# Patient Record
Sex: MALE | Race: WHITE
[De-identification: names, ages, dates, MRNs, and addresses within clinical notes are randomized per-mention and may not be internally consistent; named-entity substitution may affect disease eponyms.]

---

## 2017-05-16 ENCOUNTER — HOSPITAL ENCOUNTER (OUTPATIENT)
Dept: HOSPITAL 45 - C.LABBFT | Age: 77
Discharge: HOME | End: 2017-05-16
Attending: INTERNAL MEDICINE
Payer: COMMERCIAL

## 2017-05-16 DIAGNOSIS — N18.3: ICD-10-CM

## 2017-05-16 DIAGNOSIS — I10: Primary | ICD-10-CM

## 2017-05-16 DIAGNOSIS — R60.9: ICD-10-CM

## 2017-05-16 DIAGNOSIS — E55.9: ICD-10-CM

## 2017-05-16 DIAGNOSIS — E78.5: ICD-10-CM

## 2017-05-16 LAB
ALT SERPL-CCNC: 25 U/L (ref 12–78)
ANION GAP SERPL CALC-SCNC: 5 MMOL/L (ref 3–11)
APPEARANCE UR: CLEAR
AST SERPL-CCNC: 17 U/L (ref 15–37)
BILIRUB UR-MCNC: (no result) MG/DL
BUN SERPL-MCNC: 15 MG/DL (ref 7–18)
BUN/CREAT SERPL: 7.7 (ref 10–20)
CALCIUM SERPL-MCNC: 9.2 MG/DL (ref 8.5–10.1)
CHLORIDE SERPL-SCNC: 105 MMOL/L (ref 98–107)
CO2 SERPL-SCNC: 33 MMOL/L (ref 21–32)
COLOR UR: YELLOW
CREAT SERPL-MCNC: 1.9 MG/DL (ref 0.6–1.4)
CREAT UR-MCNC: 160 MG/DL
EOSINOPHIL NFR BLD AUTO: 145 K/UL (ref 130–400)
GLUCOSE SERPL-MCNC: 100 MG/DL (ref 70–99)
HCT VFR BLD CALC: 47.3 % (ref 42–52)
MANUAL MICROSCOPIC REQUIRED?: NO
MCH RBC QN AUTO: 30.9 PG (ref 25–34)
MCHC RBC AUTO-ENTMCNC: 32.6 G/DL (ref 32–36)
MCV RBC AUTO: 94.8 FL (ref 80–100)
NITRITE UR QL STRIP: (no result)
PH UR STRIP: 6 [PH] (ref 4.5–7.5)
PHOSPHATE SERPL-MCNC: 2.9 MG/DL (ref 2.5–4.9)
PMV BLD AUTO: 10.4 FL (ref 7.4–10.4)
POTASSIUM SERPL-SCNC: 4.2 MMOL/L (ref 3.5–5.1)
PROT UR STRIP-MCNC: 23.6 MG/DL (ref 0–11.9)
RBC # BLD AUTO: 4.99 M/UL (ref 4.7–6.1)
REVIEW REQ?: NO
SODIUM SERPL-SCNC: 143 MMOL/L (ref 136–145)
SP GR UR STRIP: 1.01 (ref 1–1.03)
URINE EPITHELIAL CELL AUTO: (no result) /LPF (ref 0–5)
URINE PROTIEN/CREAT RATIO: 0.2 (ref 0–0.2)
UROBILINOGEN UR-MCNC: (no result) MG/DL
WBC # BLD AUTO: 8.09 K/UL (ref 4.8–10.8)

## 2017-05-22 ENCOUNTER — HOSPITAL ENCOUNTER (OUTPATIENT)
Dept: HOSPITAL 45 - C.RAD1850 | Age: 77
Discharge: HOME | End: 2017-05-22
Attending: INTERNAL MEDICINE
Payer: COMMERCIAL

## 2017-05-22 DIAGNOSIS — I51.7: ICD-10-CM

## 2017-05-22 DIAGNOSIS — I12.9: ICD-10-CM

## 2017-05-22 DIAGNOSIS — R60.9: ICD-10-CM

## 2017-05-22 DIAGNOSIS — N18.3: Primary | ICD-10-CM

## 2017-05-22 NOTE — DIAGNOSTIC IMAGING REPORT
TWO VIEW CHEST



CLINICAL HISTORY: COPD. History of left lung resection.



FINDINGS: PA and lateral chest radiographs are compared to study dated 10/4/2016

and correlated with chest CT dated 3/7/2016. The PA view is degraded by patient

rotation. The heart is enlarged and there is atherosclerotic calcification of

the thoracic aorta.  The pulmonary vasculature is noncongested. Emphysema and

chronic interstitial thickening are similar to previous. There is postoperative

change and volume loss in the left lung consistent with a history of left sided

pulmonary resection. Airspace opacities at the left lung base are similar to

previous. The right lung is grossly clear. No pleural effusion is identified.

There is no pneumothorax. The skeletal structures are osteopenic. Degenerative

change is noted throughout the thoracic spine. Postoperative change is suggested

in the left ribs.



IMPRESSION:



1. Cardiomegaly and emphysema.



2. There are postoperative changes from left-sided pulmonary resection.



3. Airspace opacities at the left lung base are unchanged from previous.



4. No acute cardiopulmonary abnormality is seen.







Electronically signed by:  Lico Blood M.D.

5/22/2017 2:10 PM



Dictated Date/Time:  5/22/2017 2:08 PM

## 2017-11-14 ENCOUNTER — HOSPITAL ENCOUNTER (OUTPATIENT)
Dept: HOSPITAL 45 - C.LABBFT | Age: 77
Discharge: HOME | End: 2017-11-14
Attending: INTERNAL MEDICINE
Payer: COMMERCIAL

## 2017-11-14 DIAGNOSIS — I12.9: Primary | ICD-10-CM

## 2017-11-14 DIAGNOSIS — E55.9: ICD-10-CM

## 2017-11-14 DIAGNOSIS — N18.3: ICD-10-CM

## 2017-11-14 DIAGNOSIS — R60.9: ICD-10-CM

## 2017-11-14 DIAGNOSIS — E78.5: ICD-10-CM

## 2017-11-14 DIAGNOSIS — R73.01: ICD-10-CM

## 2017-11-14 LAB
ANION GAP SERPL CALC-SCNC: 5 MMOL/L (ref 3–11)
APPEARANCE UR: CLEAR
BILIRUB UR-MCNC: (no result) MG/DL
BUN SERPL-MCNC: 14 MG/DL (ref 7–18)
BUN/CREAT SERPL: 9.2 (ref 10–20)
CALCIUM SERPL-MCNC: 9.2 MG/DL (ref 8.5–10.1)
CHLORIDE SERPL-SCNC: 104 MMOL/L (ref 98–107)
CHOLEST/HDLC SERPL: 3.5 {RATIO}
CO2 SERPL-SCNC: 33 MMOL/L (ref 21–32)
COLOR UR: YELLOW
CREAT SERPL-MCNC: 1.55 MG/DL (ref 0.6–1.4)
CREAT UR-MCNC: 125 MG/DL
EOSINOPHIL NFR BLD AUTO: 149 K/UL (ref 130–400)
EST. AVERAGE GLUCOSE BLD GHB EST-MCNC: 103 MG/DL
GLUCOSE SERPL-MCNC: 98 MG/DL (ref 70–99)
GLUCOSE UR QL: 42 MG/DL
HCT VFR BLD CALC: 40.5 % (ref 42–52)
KETONES UR QL STRIP: 84 MG/DL
MANUAL MICROSCOPIC REQUIRED?: NO
MCH RBC QN AUTO: 30.5 PG (ref 25–34)
MCHC RBC AUTO-ENTMCNC: 32.6 G/DL (ref 32–36)
MCV RBC AUTO: 93.5 FL (ref 80–100)
NITRITE UR QL STRIP: (no result)
NITRITE UR QL STRIP: 110 MG/DL (ref 0–150)
PH UR STRIP: 6 [PH] (ref 4.5–7.5)
PH UR: 148 MG/DL (ref 0–200)
PHOSPHATE SERPL-MCNC: 2.6 MG/DL (ref 2.5–4.9)
PMV BLD AUTO: 10.1 FL (ref 7.4–10.4)
POTASSIUM SERPL-SCNC: 4 MMOL/L (ref 3.5–5.1)
PROT UR STRIP-MCNC: 21.9 MG/DL (ref 0–11.9)
RBC # BLD AUTO: 4.33 M/UL (ref 4.7–6.1)
REVIEW REQ?: NO
SODIUM SERPL-SCNC: 142 MMOL/L (ref 136–145)
SP GR UR STRIP: 1.01 (ref 1–1.03)
URINE EPITHELIAL CELL AUTO: (no result) /LPF (ref 0–5)
URINE PROTIEN/CREAT RATIO: 0.2 (ref 0–0.2)
UROBILINOGEN UR-MCNC: (no result) MG/DL
VERY LOW DENSITY LIPOPROT CALC: 22 MG/DL
WBC # BLD AUTO: 8.45 K/UL (ref 4.8–10.8)

## 2017-11-27 ENCOUNTER — HOSPITAL ENCOUNTER (INPATIENT)
Dept: HOSPITAL 45 - C.EDB | Age: 77
LOS: 3 days | Discharge: HOME HEALTH | DRG: 178 | End: 2017-11-30
Attending: HOSPITALIST | Admitting: HOSPITALIST
Payer: COMMERCIAL

## 2017-11-27 VITALS
TEMPERATURE: 97.52 F | DIASTOLIC BLOOD PRESSURE: 102 MMHG | OXYGEN SATURATION: 91 % | HEART RATE: 108 BPM | SYSTOLIC BLOOD PRESSURE: 164 MMHG

## 2017-11-27 VITALS
HEIGHT: 72 IN | WEIGHT: 243.83 LBS | HEIGHT: 72 IN | BODY MASS INDEX: 33.03 KG/M2 | WEIGHT: 243.83 LBS | BODY MASS INDEX: 33.03 KG/M2

## 2017-11-27 DIAGNOSIS — J69.0: Primary | ICD-10-CM

## 2017-11-27 DIAGNOSIS — Z79.899: ICD-10-CM

## 2017-11-27 DIAGNOSIS — N18.3: ICD-10-CM

## 2017-11-27 DIAGNOSIS — J96.10: ICD-10-CM

## 2017-11-27 DIAGNOSIS — I12.9: ICD-10-CM

## 2017-11-27 DIAGNOSIS — I48.91: ICD-10-CM

## 2017-11-27 DIAGNOSIS — Z79.01: ICD-10-CM

## 2017-11-27 DIAGNOSIS — Z99.81: ICD-10-CM

## 2017-11-27 DIAGNOSIS — J44.1: ICD-10-CM

## 2017-11-27 DIAGNOSIS — R04.2: ICD-10-CM

## 2017-11-27 DIAGNOSIS — G62.9: ICD-10-CM

## 2017-11-27 DIAGNOSIS — H40.9: ICD-10-CM

## 2017-11-27 DIAGNOSIS — G47.00: ICD-10-CM

## 2017-11-27 LAB
ALBUMIN/GLOB SERPL: 0.8 {RATIO} (ref 0.9–2)
ALP SERPL-CCNC: 60 U/L (ref 45–117)
ALT SERPL-CCNC: 14 U/L (ref 12–78)
ANION GAP SERPL CALC-SCNC: 4 MMOL/L (ref 3–11)
APPEARANCE UR: CLEAR
AST SERPL-CCNC: 13 U/L (ref 15–37)
BASOPHILS # BLD: 0.04 K/UL (ref 0–0.2)
BASOPHILS NFR BLD: 0.4 %
BILIRUB UR-MCNC: (no result) MG/DL
BUN SERPL-MCNC: 8 MG/DL (ref 7–18)
BUN/CREAT SERPL: 6 (ref 10–20)
CALCIUM SERPL-MCNC: 8.4 MG/DL (ref 8.5–10.1)
CHLORIDE SERPL-SCNC: 101 MMOL/L (ref 98–107)
CKMB/CK RATIO: 2.1 (ref 0–3)
CO2 SERPL-SCNC: 32 MMOL/L (ref 21–32)
COLOR UR: YELLOW
COMPLETE: YES
CREAT CL PREDICTED SERPL C-G-VRATE: 56 ML/MIN
CREAT SERPL-MCNC: 1.4 MG/DL (ref 0.6–1.4)
EOSINOPHIL NFR BLD AUTO: 135 K/UL (ref 130–400)
GLOBULIN SER-MCNC: 3.8 GM/DL (ref 2.5–4)
GLUCOSE SERPL-MCNC: 106 MG/DL (ref 70–99)
HCT VFR BLD CALC: 40.4 % (ref 42–52)
IG%: 0.2 %
IMM GRANULOCYTES NFR BLD AUTO: 12.1 %
INR PPP: 2.2 (ref 0.9–1.1)
LYMPHOCYTES # BLD: 1.17 K/UL (ref 1.2–3.4)
MANUAL MICROSCOPIC REQUIRED?: NO
MCH RBC QN AUTO: 30.9 PG (ref 25–34)
MCHC RBC AUTO-ENTMCNC: 32.9 G/DL (ref 32–36)
MCV RBC AUTO: 93.7 FL (ref 80–100)
MONOCYTES NFR BLD: 5.5 %
NEUTROPHILS # BLD AUTO: 2.7 %
NEUTROPHILS NFR BLD AUTO: 79.1 %
NITRITE UR QL STRIP: (no result)
PARTIAL THROMBOPLASTIN RATIO: 1.5
PH UR STRIP: 6 [PH] (ref 4.5–7.5)
PMV BLD AUTO: 9.6 FL (ref 7.4–10.4)
POTASSIUM SERPL-SCNC: 3.6 MMOL/L (ref 3.5–5.1)
PROTHROMBIN TIME: 24.4 SECONDS (ref 9–12)
RBC # BLD AUTO: 4.31 M/UL (ref 4.7–6.1)
REVIEW REQ?: NO
SODIUM SERPL-SCNC: 137 MMOL/L (ref 136–145)
SP GR UR STRIP: 1.02 (ref 1–1.03)
URINE BILL WITH OR WITHOUT MIC: (no result)
URINE EPITHELIAL CELL AUTO: (no result) /LPF (ref 0–5)
UROBILINOGEN UR-MCNC: (no result) MG/DL
WBC # BLD AUTO: 9.63 K/UL (ref 4.8–10.8)

## 2017-11-27 RX ADMIN — TRAZODONE HYDROCHLORIDE PRN MG: 50 TABLET ORAL at 23:54

## 2017-11-27 NOTE — DIAGNOSTIC IMAGING REPORT
CHEST ONE VIEW PORTABLE



CLINICAL HISTORY: 77 years-old Male presenting with EVALUATE RESPIRATORY

DISTRESS.DYSPNEA. 



TECHNIQUE: Portable upright AP view of the chest was obtained.



COMPARISON: 5/22/2017.



FINDINGS:

The patient is CAMPOS rotated. Atherosclerosis of aortic arch. Cardiac silhouette

enlarged and largely obscured along the left heart border. Extensive

opacification of the left lung suspected allowing for patient rotation.

Additionally, focal density projects over the right paratracheal region. No

pneumothorax. Osseous structures normal. Upper abdomen normal.



IMPRESSION:

1.  Limited evaluation secondary to patient rotation and portable AP technique.

Further evaluation with chest CT recommended.



2.  Cardiomegaly.



3.  Extensive left lung opacity likely consolidation with effusion.



4.  Right paratracheal density of uncertain etiology. Lymphadenopathy not

excluded.







Electronically signed by:  Cullen Leung M.D.

11/27/2017 7:03 PM



Dictated Date/Time:  11/27/2017 7:01 PM

## 2017-11-27 NOTE — DIAGNOSTIC IMAGING REPORT
(CHEST) THORAX WITHOUT



CLINICAL HISTORY: 77 years-old Male presenting with left sided effusion/mass,

abn cxr. 



TECHNIQUE: Multidetector CT imaging of the chest was performed without the use

of intravenous contrast. IV contrast: None. A dose lowering technique was used

consistent with the principles of ALARA (as low as reasonably achievable).



COMPARISON: Chest x-ray performed earlier the same day an chest CT from

3/7/2016.



CT DOSE (mGy.cm): The estimated cumulative dose is 695.59 mGy.cm.



FINDINGS:



 topogram: Extensive left basilar opacity.



On soft tissue windows, bilateral gynecomastia. Normal thyroid. Multiple

enlarged mediastinal lymph nodes measuring up to 16 mm in the short axis. These

were present on the prior exam and are stable to slightly increased in size.

Atherosclerosis of the aorta. Multichamber enlargement of the heart. Coronary

artery calcification. No pericardial or pleural effusion. Upper abdomen normal.



On lung windows, extensive left lung volume loss stable to slightly increased

from prior exam. Groundglass and reticular opacities as well as patchy solid

consolidation noted in the left lung primarily in the lingula and basal segments

of the left lower lobe. A suture margin may be present at the left lung base.

Solid peripheral nodule noted in the superior segment of the left lower lobe

measuring 8 mm new from prior. Extensive bronchial wall thickening and debris in

the left mainstem bronchus and left lung airways. Only mild bronchial wall

thickening noted in the right lung. Minimal patchy groundglass opacity noted in

the right lower lobe. Solid 5 mm pulmonary nodule in the right upper lobe

(series 4 image 118). Few scattered additional smaller nodules in the right

lung.



On bone windows, degenerative changes of the spine.



IMPRESSION:

1.  Interval increase in left lung consolidation primarily in the lingula and

basal segments with extensive bronchial wall thickening and debris in the

airways of the left lung. This is concerning for chronic aspiration/aspiration

pneumonitis. This appearance is less typical for other diagnostic

considerations, which include an atypical infection such as mycobacterium avium

intracellulare.



2.  Scattered nodularity in the lungs bilaterally. This may represent an

infectious or inflammatory etiology, however, follow-up per [employer] Society

2017 recommendations below.



3.  Stable slight interval increase in mediastinal lymphadenopathy. Although

this may be reactive given the presence of inflammation/infection within the

lungs, follow-up should be obtained.



Please refer to below summary of Fleischner Society 2017 recommendations for

follow-up of incidental CT nodules (H Maria Luz et al. Guidelines for management

of incidental pulmonary nodules detected on CT images: From the Fleischner

Society 2017. Radiology 2017; 284: 228-243.)



SOLID NODULES



Single nodule; size < 6 mm

*  Low risk patients: No routine follow-up

*  High risk patients: Optional CT at 12 months



Single nodule; size 6-8 mm

*  Low risk patients: CT at 6-12 months, then consider CT at 18-24 months

*  High risk patients: CT at 6-12 months, then at 18-24 months



Single nodule; size > 8 mm

*  Either low or high risk patients: Considered CT at 3 months, PET/CT, or

tissue sampling



Multiple nodules; size < 6 mm

*  Low risk patients: No routine follow up

*  High risk patients: Optional CT at 12 months



Multiple nodules; size 6-8 mm

*  Low risk patients: CT at 3-6 months, then consider CT at 18-24 months

*  High risk patients: CT at 3-6 months, then at 18-24 months



Multiple nodules; size > 8 mm

*  Low risk patients: CT at 3-6 months, then consider at 18-24 months

*  High risk patients: CT at 3-6 months, then at 18-24 months





Note: These guidelines apply to incidental nodules. These guidelines do not

apply to patients younger than 35 years, immunocompromised patients, or patients

with cancer.

*  Low risk patients: Minimal or absent history of smoking and/or other known

risk factors

*  High risk patients: History of smoking, exposure to other carcinogens,

emphysema, fibrosis, upper lobe location, family history of lung cancer, etc. 

*  If a nodule up to 8 mm is partly solid or is ground glass, further follow-up

is required after 24 months to exclude possible slow growing adenocarcinoma.





SUBSOLID NODULES



Single ground-glass nodule

*  Nodule size < 6 mm: No routine follow-up

*  Nodule size > or = 6 mm: CT at 6-12 months to confirm persistence, then CT

every 2 years until 5 years



Single part-solid nodule

*  Nodule size < 6 mm: No routine follow-up

*  Nodules size > or = 6 mm: CT at 3-6 months to confirm persistence. If

unchanged and solid component remains < 6 mm, annual CT should be performed for

5 years



Multiple nodules

*  Nodule size < 6 mm: CT at 3-6 months. If stable, consider CT at 2 and 4

years.

*  Nodules size > or = 6 mm: CT at 3-6 months. Subsequent management based on

the most suspicious nodule(s)







       







Electronically signed by:  Cullen Leung M.D.

11/27/2017 8:48 PM



Dictated Date/Time:  11/27/2017 8:38 PM

## 2017-11-28 VITALS — HEART RATE: 100 BPM | OXYGEN SATURATION: 96 %

## 2017-11-28 VITALS — OXYGEN SATURATION: 96 % | HEART RATE: 64 BPM

## 2017-11-28 VITALS
OXYGEN SATURATION: 96 % | HEART RATE: 106 BPM | SYSTOLIC BLOOD PRESSURE: 158 MMHG | TEMPERATURE: 97.52 F | DIASTOLIC BLOOD PRESSURE: 89 MMHG

## 2017-11-28 VITALS
OXYGEN SATURATION: 96 % | TEMPERATURE: 97.88 F | DIASTOLIC BLOOD PRESSURE: 90 MMHG | SYSTOLIC BLOOD PRESSURE: 173 MMHG | HEART RATE: 106 BPM

## 2017-11-28 VITALS — HEART RATE: 92 BPM | OXYGEN SATURATION: 96 %

## 2017-11-28 VITALS — HEART RATE: 96 BPM | DIASTOLIC BLOOD PRESSURE: 87 MMHG | SYSTOLIC BLOOD PRESSURE: 170 MMHG

## 2017-11-28 VITALS
HEART RATE: 97 BPM | OXYGEN SATURATION: 96 % | DIASTOLIC BLOOD PRESSURE: 105 MMHG | SYSTOLIC BLOOD PRESSURE: 162 MMHG | TEMPERATURE: 98.06 F

## 2017-11-28 VITALS
HEART RATE: 94 BPM | TEMPERATURE: 97.88 F | OXYGEN SATURATION: 97 % | DIASTOLIC BLOOD PRESSURE: 90 MMHG | SYSTOLIC BLOOD PRESSURE: 169 MMHG

## 2017-11-28 VITALS
HEART RATE: 88 BPM | OXYGEN SATURATION: 97 % | DIASTOLIC BLOOD PRESSURE: 98 MMHG | TEMPERATURE: 98.06 F | SYSTOLIC BLOOD PRESSURE: 184 MMHG

## 2017-11-28 VITALS — HEART RATE: 104 BPM | OXYGEN SATURATION: 96 %

## 2017-11-28 LAB
ANION GAP SERPL CALC-SCNC: 5 MMOL/L (ref 3–11)
BASOPHILS # BLD: 0.02 K/UL (ref 0–0.2)
BASOPHILS NFR BLD: 0.3 %
BUN SERPL-MCNC: 9 MG/DL (ref 7–18)
BUN/CREAT SERPL: 6 (ref 10–20)
CALCIUM SERPL-MCNC: 8.7 MG/DL (ref 8.5–10.1)
CHLORIDE SERPL-SCNC: 102 MMOL/L (ref 98–107)
CO2 SERPL-SCNC: 30 MMOL/L (ref 21–32)
COMPLETE: YES
CREAT CL PREDICTED SERPL C-G-VRATE: 49 ML/MIN
CREAT SERPL-MCNC: 1.57 MG/DL (ref 0.6–1.4)
EOSINOPHIL NFR BLD AUTO: 139 K/UL (ref 130–400)
GLUCOSE SERPL-MCNC: 189 MG/DL (ref 70–99)
HCT VFR BLD CALC: 41.2 % (ref 42–52)
IG%: 0.3 %
IMM GRANULOCYTES NFR BLD AUTO: 9.5 %
INR PPP: 2.6 (ref 0.9–1.1)
LYMPHOCYTES # BLD: 0.63 K/UL (ref 1.2–3.4)
MAGNESIUM SERPL-MCNC: 1.9 MG/DL (ref 1.8–2.4)
MCH RBC QN AUTO: 30.8 PG (ref 25–34)
MCHC RBC AUTO-ENTMCNC: 33.3 G/DL (ref 32–36)
MCV RBC AUTO: 92.6 FL (ref 80–100)
MONOCYTES NFR BLD: 0.8 %
NEUTROPHILS # BLD AUTO: 0 %
NEUTROPHILS NFR BLD AUTO: 89.1 %
PARTIAL THROMBOPLASTIN RATIO: 1.6
PMV BLD AUTO: 10.4 FL (ref 7.4–10.4)
POTASSIUM SERPL-SCNC: 3.9 MMOL/L (ref 3.5–5.1)
PROTHROMBIN TIME: 29.4 SECONDS (ref 9–12)
RBC # BLD AUTO: 4.45 M/UL (ref 4.7–6.1)
SODIUM SERPL-SCNC: 137 MMOL/L (ref 136–145)
WBC # BLD AUTO: 6.65 K/UL (ref 4.8–10.8)

## 2017-11-28 RX ADMIN — CEFEPIME SCH MLS/MIN: 2 INJECTION, POWDER, FOR SOLUTION INTRAVENOUS at 00:00

## 2017-11-28 RX ADMIN — LEVALBUTEROL SCH MG: 1.25 SOLUTION, CONCENTRATE RESPIRATORY (INHALATION) at 07:14

## 2017-11-28 RX ADMIN — IPRATROPIUM BROMIDE SCH MG: 0.5 SOLUTION RESPIRATORY (INHALATION) at 19:14

## 2017-11-28 RX ADMIN — CEFEPIME SCH MLS/MIN: 2 INJECTION, POWDER, FOR SOLUTION INTRAVENOUS at 23:36

## 2017-11-28 RX ADMIN — GUAIFENESIN SCH MG: 200 TABLET ORAL at 20:36

## 2017-11-28 RX ADMIN — IPRATROPIUM BROMIDE SCH MG: 0.5 SOLUTION RESPIRATORY (INHALATION) at 01:45

## 2017-11-28 RX ADMIN — IPRATROPIUM BROMIDE SCH MG: 0.5 SOLUTION RESPIRATORY (INHALATION) at 14:28

## 2017-11-28 RX ADMIN — BETHANECHOL CHLORIDE SCH MG: 25 TABLET ORAL at 07:55

## 2017-11-28 RX ADMIN — BUDESONIDE SCH MG: 0.5 SUSPENSION RESPIRATORY (INHALATION) at 07:14

## 2017-11-28 RX ADMIN — IPRATROPIUM BROMIDE SCH MG: 0.5 SOLUTION RESPIRATORY (INHALATION) at 07:14

## 2017-11-28 RX ADMIN — LEVOFLOXACIN SCH MLS/HR: 5 INJECTION, SOLUTION INTRAVENOUS at 20:35

## 2017-11-28 RX ADMIN — METHYLPREDNISOLONE SODIUM SUCCINATE SCH MLS/MIN: 1 INJECTION, POWDER, FOR SOLUTION INTRAMUSCULAR; INTRAVENOUS at 11:46

## 2017-11-28 RX ADMIN — METOPROLOL TARTRATE SCH MG: 100 TABLET, FILM COATED ORAL at 07:56

## 2017-11-28 RX ADMIN — BIMATOPROST SCH DROPS: 0.1 SOLUTION/ DROPS OPHTHALMIC at 20:36

## 2017-11-28 RX ADMIN — LEVALBUTEROL SCH MG: 1.25 SOLUTION, CONCENTRATE RESPIRATORY (INHALATION) at 14:28

## 2017-11-28 RX ADMIN — TRAZODONE HYDROCHLORIDE PRN MG: 50 TABLET ORAL at 22:19

## 2017-11-28 RX ADMIN — GABAPENTIN SCH MG: 600 TABLET, FILM COATED ORAL at 13:31

## 2017-11-28 RX ADMIN — LEVALBUTEROL SCH MG: 1.25 SOLUTION, CONCENTRATE RESPIRATORY (INHALATION) at 19:14

## 2017-11-28 RX ADMIN — WARFARIN SCH MG: 2 TABLET ORAL at 15:52

## 2017-11-28 RX ADMIN — METOPROLOL TARTRATE SCH MG: 100 TABLET, FILM COATED ORAL at 20:38

## 2017-11-28 RX ADMIN — LEVALBUTEROL SCH MG: 1.25 SOLUTION, CONCENTRATE RESPIRATORY (INHALATION) at 01:45

## 2017-11-28 RX ADMIN — GABAPENTIN SCH MG: 600 TABLET, FILM COATED ORAL at 07:56

## 2017-11-28 RX ADMIN — GUAIFENESIN SCH MG: 200 TABLET ORAL at 07:55

## 2017-11-28 RX ADMIN — MONTELUKAST SODIUM SCH MG: 10 TABLET, FILM COATED ORAL at 07:55

## 2017-11-28 RX ADMIN — GABAPENTIN SCH MG: 600 TABLET, FILM COATED ORAL at 20:36

## 2017-11-28 RX ADMIN — BUDESONIDE SCH MG: 0.5 SUSPENSION RESPIRATORY (INHALATION) at 19:14

## 2017-11-28 RX ADMIN — Medication SCH INTER.UNIT: at 07:57

## 2017-11-28 RX ADMIN — BETHANECHOL CHLORIDE SCH MG: 25 TABLET ORAL at 20:37

## 2017-11-28 RX ADMIN — METHYLPREDNISOLONE SODIUM SUCCINATE SCH MLS/MIN: 1 INJECTION, POWDER, FOR SOLUTION INTRAMUSCULAR; INTRAVENOUS at 20:35

## 2017-11-28 RX ADMIN — METHYLPREDNISOLONE SODIUM SUCCINATE SCH MLS/MIN: 1 INJECTION, POWDER, FOR SOLUTION INTRAMUSCULAR; INTRAVENOUS at 04:51

## 2017-11-28 RX ADMIN — CEFEPIME SCH MLS/MIN: 2 INJECTION, POWDER, FOR SOLUTION INTRAVENOUS at 11:46

## 2017-11-28 NOTE — HOSPITALIST PROGRESS NOTE
Hospitalist Progress Note


Date of Service


Nov 28, 2017.





Subjective


Pt evaluation today including:  conversation w/ patient, physical exam, chart 

review, lab review, review of studies


Pain:  None


PO Intake:  Good


Voiding:  no voiding problems


The patient was seen and examined this morning. Pt reports doing better today 

compared to yesterday. He is coughing with white-yellow mucous and has a 

culture cup at bedside. Pt notes he is able to ambulate slowly without feeling 

short of breath, but that if he hurries he is quickly winded. Pt admits to 

hemoptysis yesterday morning with pink tinged sputum, denies dark or bright red 

blood in sputum.  Pt denies any chest pain or tightness, fever, sweats or 

chills.  





He typically wears 2 L O2 at baseline but was requiring 3 L last evening. Pt 

spoke with Dr. Abraham this am, and reports bronchoscopy was a possibility.  He 

has outpatient scheduled PFTs in 3 weeks.  





ROS: 6 point ROS reviewed and otherwise negative.





Objective


Vital Signs











  Date Time  Temp Pulse Resp B/P (MAP) Pulse Ox O2 Delivery O2 Flow Rate FiO2


 


11/28/17 07:26 36.7 97 20 162/105 (124) 96 Nasal Cannula 2.0 


 


11/28/17 07:14  64 14  96 Nasal Cannula 2.0 


 


11/28/17 04:00      Nasal Cannula 2.0 


 


11/28/17 01:49  92 14  96 Nasal Cannula 2.0 


 


11/27/17 23:55 36.4 108 16 164/102 91 Nasal Cannula 2.0 


 


11/27/17 23:06  90 22  95 Nasal Cannula 2.0 


 


11/27/17 21:59  102      


 


11/27/17 21:46  92 18 174/98 97 Room Air  


 


11/27/17 19:37      Nasal Cannula 2.0 


 


11/27/17 19:37  88 21 165/95  Nasal Cannula 2.0 


 


11/27/17 18:00      Room Air  


 


11/27/17 18:00  100      


 


11/27/17 17:34 36.4 105 20 165/95 97 Nasal Cannula 2.0 


 


11/27/17 17:34     94 Nasal Cannula 2.0 











Physical Exam


General Appearance:  WD/WN, no apparent distress, + obese


Eyes:  PERRL, EOMI


ENT:  hearing grossly normal, pharynx normal


Neck:  supple, no JVD


Respiratory/Chest:  chest non-tender, no accessory muscle use, + pertinent 

finding (on RA initially, and placed NC on 2 L during exam, +inspiratory and 

expiratory wheeze prominent in the SALONI.  No crackles or rales.)


Cardiovascular:  regular rate, rhythm, no murmur


Abdomen:  normal bowel sounds, non tender, soft


Extremities:  non-tender, no pedal edema


Neurologic/Psychiatric:  alert, oriented x 3


Skin:  normal color, warm/dry





Laboratory Results





Last 24 Hours








Test


  11/27/17


18:51 11/27/17


19:00 11/28/17


05:19


 


White Blood Count 9.63 K/uL   6.65 K/uL 


 


Red Blood Count 4.31 M/uL   4.45 M/uL 


 


Hemoglobin 13.3 g/dL   13.7 g/dL 


 


Hematocrit 40.4 %   41.2 % 


 


Mean Corpuscular Volume 93.7 fL   92.6 fL 


 


Mean Corpuscular Hemoglobin 30.9 pg   30.8 pg 


 


Mean Corpuscular Hemoglobin


Concent 32.9 g/dl 


  


  33.3 g/dl 


 


 


Platelet Count 135 K/uL   139 K/uL 


 


Mean Platelet Volume 9.6 fL   10.4 fL 


 


Neutrophils (%) (Auto) 79.1 %   89.1 % 


 


Lymphocytes (%) (Auto) 12.1 %   9.5 % 


 


Monocytes (%) (Auto) 5.5 %   0.8 % 


 


Eosinophils (%) (Auto) 2.7 %   0.0 % 


 


Basophils (%) (Auto) 0.4 %   0.3 % 


 


Neutrophils # (Auto) 7.61 K/uL   5.93 K/uL 


 


Lymphocytes # (Auto) 1.17 K/uL   0.63 K/uL 


 


Monocytes # (Auto) 0.53 K/uL   0.05 K/uL 


 


Eosinophils # (Auto) 0.26 K/uL   0.00 K/uL 


 


Basophils # (Auto) 0.04 K/uL   0.02 K/uL 


 


RDW Standard Deviation 50.9 fL   49.2 fL 


 


RDW Coefficient of Variation 14.8 %   14.5 % 


 


Immature Granulocyte % (Auto) 0.2 %   0.3 % 


 


Immature Granulocyte # (Auto) 0.02 K/uL   0.02 K/uL 


 


Prothrombin Time 24.4 SECONDS   29.4 SECONDS 


 


Prothromb Time International


Ratio 2.2 


  


  2.6 


 


 


Activated Partial


Thromboplast Time 38.5 SECONDS 


  


  41.1 SECONDS 


 


 


Partial Thromboplastin Ratio 1.5   1.6 


 


Sodium Level 137 mmol/L   137 mmol/L 


 


Potassium Level 3.6 mmol/L   3.9 mmol/L 


 


Chloride Level 101 mmol/L   102 mmol/L 


 


Carbon Dioxide Level 32 mmol/L   30 mmol/L 


 


Anion Gap 4.0 mmol/L   5.0 mmol/L 


 


Blood Urea Nitrogen 8 mg/dl   9 mg/dl 


 


Creatinine 1.40 mg/dl   1.57 mg/dl 


 


Est Creatinine Clear Calc


Drug Dose 56.0 ml/min 


  


  49.0 ml/min 


 


 


Estimated GFR (


American) 55.0 


  


  47.9 


 


 


Estimated GFR (Non-


American 47.5 


  


  41.3 


 


 


BUN/Creatinine Ratio 6.0   6.0 


 


Random Glucose 106 mg/dl   189 mg/dl 


 


Calcium Level 8.4 mg/dl   8.7 mg/dl 


 


Total Bilirubin 1.0 mg/dl   


 


Aspartate Amino Transf


(AST/SGOT) 13 U/L 


  


  


 


 


Alanine Aminotransferase


(ALT/SGPT) 14 U/L 


  


  


 


 


Alkaline Phosphatase 60 U/L   


 


Total Creatine Kinase 43 U/L   


 


Creatine Kinase MB 0.9 ng/ml   


 


Creatine Kinase MB Ratio 2.1   


 


Troponin I < 0.015 ng/ml   


 


Pro-B-Type Natriuretic Peptide 3926 pg/ml   


 


Total Protein 6.9 gm/dl   


 


Albumin 3.1 gm/dl   


 


Globulin 3.8 gm/dl   


 


Albumin/Globulin Ratio 0.8   


 


Urine Color  YELLOW  


 


Urine Appearance  CLEAR  


 


Urine pH  6.0  


 


Urine Specific Gravity  1.017  


 


Urine Protein  1+  


 


Urine Glucose (UA)  NEG  


 


Urine Ketones  TRACE  


 


Urine Occult Blood  NEG  


 


Urine Nitrite  NEG  


 


Urine Bilirubin  NEG  


 


Urine Urobilinogen  NEG  


 


Urine Leukocyte Esterase  NEG  


 


Urine WBC (Auto)  1-5 /hpf  


 


Urine RBC (Auto)  0-4 /hpf  


 


Urine Hyaline Casts (Auto)  1-5 /lpf  


 


Urine Epithelial Cells (Auto)  5-10 /lpf  


 


Urine Bacteria (Auto)  NEG  


 


Magnesium Level   1.9 mg/dl 


 


Chemistry Specimen Hemolysis    











Assessment and Plan


This is a77 yo M with PMHx of 





Lingular and left lower lobe pneumonia/bilateral nodules, mediastinal 

lymphadenopathy, hemoptysis


Chronic respiratory failure exacerbated in the setting of pneumonia


COPD


- Place on vancomycin IV, cefepime IV and levofloxacin IV - continue for now


- Guaifenesin extended release 600 mg PO BID - collect sputum culture


- Xopenex with Atrovent nebulizer Q6H while awake and Q2H prn


- Solu-Medrol 30 mg IV every 8 hours


- Nasal cannula 3 L oxygen titrated to keep pulse ox greater than or equal to 92

%. - Wears 2 L at baseline. 


- Hold Dulera.


- Continue montelukast


- Pulmonology consulted - follows with Dr. Abraham.


   Possible needs for bronchoscopy - will await pulm recs - appreciate


   Has outpatient PFTs in 3 weeks. 





Atrial fibrillation/hypertension/chronic anticoagulation--


- Continue metoprolol tartrate 100 mg PO BID


- INR therapeutic at 2.6


- Warfarin 2 mg by mouth daily, follow PT/INR.


- Will need to hold anticoagulation or possibly reversed if bronchoscopy is to 

be performed.





HTN


- BP elevated in the 160s systolically


- Continue metoprolol as above, consider IV hydralazine or lopressor if is not 

better controlled throughout the day.





Peripheral neuropathy


- Continue gabapentin 600 mg by mouth 3 times a day.





Glaucoma


- Continue Lumigan





Insomnia


- Continue trazodone when necessary





DVT ppx: coumadin





CODE STATUS: FULL CODE





Disposition: From home, possible discharge in 1-2 days pending pulmonology recs 

and possible needs for bronchoscopy.

## 2017-11-28 NOTE — HISTORY AND PHYSICAL
History & Physical


Date & Time of Service:


Nov 28, 2017 at 02:21.  The patient was seen and examined on 11/27/2017.


Chief Complaint:


Bacterial Pneumonia, Unsp. Copd Exacerbatoin


Primary Care Physician:


Roshan Chavez M.D.


History of Present Illness


Source:  patient, hospital records


The patient is a 77-year-old male who is referred to the emergency department 

by his PCP today for worsening shortness of breath, productive cough with blood 

tinged sputum, fatigue and wheezing that began yesterday, and did not improve 

with 2 nebulizer treatments at that office today.  He is usually on 2 L of 

oxygen at home, and he had increased it to 3 L without significant improvement 

symptoms.  He has a previous history of being admitted for pneumonia in 2016.  

He did get his flu shot this year.  He has not had any recent travels or known 

sick exposures.





Past Medical/Surgical History


Medical Problems:


(1) ANTICOAGULANTS,LT,CURRENT USE


Status: Chronic  





(2) ATRIAL FIBRILLATION


Status: Chronic  





(3) Bacterial pneumonia


Status: Resolved  





(4) Bacterial pneumonia, unspecified


Status: Resolved  





(5) Benign essential hypertension


Status: Chronic  





(6) CHRONIC KIDNEY DISEASE, STAGE III (MODERATE)


Status: Chronic  





(7) COPD (chronic obstructive pulmonary disease)


Status: Chronic  











Family History





Diabetes mellitus


FHx: kidney disease


Heart disease


Hypertension


Kidney stone





Social History


Smoking Status:  Never Smoker


Smokeless Tobacco Use:  No


Drug Use:  none


Marital Status:  


Housing status:  lives with family


Occupational Status:  retired





Immunizations


History of Influenza Vaccine:  Unknown


Influenza Vaccine Date:  Oct 11, 2011


History of Tetanus Vaccine?:  Unknown


History of Pneumococcal:  Unknown


Pneumococcal Date:  Ej 10, 2008


History of Hepatitis B Vaccine:  Unknown





Multi-Drug Resistant Organisms


History of MDRO:  No





Allergies


Coded Allergies:  


     No Known Allergies (Unverified , 11/27/17)





Home Medications


Scheduled


Bethanechol Chloride (Bethanechol Chloride), 50 MG PO BID


Bimatoprost (Lumigan), 1 DROP OPB QPM


Cholecalciferol (Vitamin D3), 2,000 UNITS PO DAILY


Gabapentin (Gabapentin), 600 MG PO TID


Ipratropium Bromide (Atrovent 0.02% Soln), 1 VIAL NEB TID


Ipratropium-Albuterol (Combivent Respimat), 2 PUFFS INH QID


Metoprolol Tartrate (Metoprolol Tartrate), 100 MG PO BID


Mometasone Furoate-Formoterol (Dulera 200/5 Mcg), 2 PUFFS INH BID


Montelukast Sod (Montelukast Sodium), 10 MG PO DAILY


Warfarin Sodium (Warfarin Sodium), 2 MG PO DAILY





Scheduled PRN


Trazodone Hcl (Trazodone), 50 MG PO HS PRN for Sleep





Review of Systems


The patient denies chest pain, palpitations, lower extremity swelling, sore 

throat, fevers, chills, sweats, weight change.


 nausea, vomiting, diarrhea or constipation, abdominal pain, pelvic pain, blood 

in urine or stool, dysuria, urinary frequency or urgency, 


lightheadedness, dizziness, headache, loss of consciousness, rash, abnormal 

bruising or bleeding,


imbalance, focal weakness, numbness or tingling in arms or legs, generalized 

arthralgias or myalgias, back or neck pain, or night sweats.


The review of systems is otherwise negative other than for that already noted 

above, and at least 10 systems have been reviewed.





Physical Exam


Vital Signs











  Date Time  Temp Pulse Resp B/P (MAP) Pulse Ox O2 Delivery O2 Flow Rate FiO2


 


11/28/17 01:49  92 14  96 Nasal Cannula 2.0 


 


11/27/17 23:55 36.4 108 16 164/102 91 Nasal Cannula 2.0 


 


11/27/17 23:06  90 22  95 Nasal Cannula 2.0 


 


11/27/17 21:59  102      


 


11/27/17 21:46  92 18 174/98 97 Room Air  


 


11/27/17 19:37      Nasal Cannula 2.0 


 


11/27/17 19:37  88 21 165/95  Nasal Cannula 2.0 


 


11/27/17 18:00      Room Air  


 


11/27/17 18:00  100      


 


11/27/17 17:34 36.4 105 20 165/95 97 Nasal Cannula 2.0 


 


11/27/17 17:34     94 Nasal Cannula 2.0 








The patient is awake, alert and oriented 3, normocephalic and atraumatic, 

lying in bed and in no acute distress.


HEENT--PERRL, EOMI, mucous membranes  and oropharynx dry.


Neck--supple, no JVD or bruits, thyroid normal, trachea midline, no adenopathy.


Heart--normal S1 and S2, no extra beats, no murmurs, rubs or gallops.


Lungs--coarse breath sounds bilaterally, worse on left, no respiratory distress

, no accessory muscle use.


Abdomen--normal bowel sounds and soft, nontender and nondistended, no hernias 

or masses,  no organomegaly.


Extremities--no cyanosis, clubbing or edema. There are good distal pulses b/l.


Dermatologic--normal skin turgor, normal color, warm and dry, no abnormal lymph 

nodes, no rash.


Neurologic--cranial nerves II through XII grossly intact.


Rheumatologic--normal range of motion.


Psychiatric--normal affect.





Diagnostics


Laboratory Results





Results Past 24 Hours








Test


  11/27/17


18:51 11/27/17


19:00 Range/Units


 


 


White Blood Count 9.63  4.8-10.8  K/uL


 


Red Blood Count 4.31  4.7-6.1  M/uL


 


Hemoglobin 13.3  14.0-18.0  g/dL


 


Hematocrit 40.4  42-52  %


 


Mean Corpuscular Volume 93.7    fL


 


Mean Corpuscular Hemoglobin 30.9  25-34  pg


 


Mean Corpuscular Hemoglobin


Concent 32.9


  


  32-36  g/dl


 


 


Platelet Count 135  130-400  K/uL


 


Mean Platelet Volume 9.6  7.4-10.4  fL


 


Neutrophils (%) (Auto) 79.1   %


 


Lymphocytes (%) (Auto) 12.1   %


 


Monocytes (%) (Auto) 5.5   %


 


Eosinophils (%) (Auto) 2.7   %


 


Basophils (%) (Auto) 0.4   %


 


Neutrophils # (Auto) 7.61  1.4-6.5  K/uL


 


Lymphocytes # (Auto) 1.17  1.2-3.4  K/uL


 


Monocytes # (Auto) 0.53  0.11-0.59  K/uL


 


Eosinophils # (Auto) 0.26  0-0.5  K/uL


 


Basophils # (Auto) 0.04  0-0.2  K/uL


 


RDW Standard Deviation 50.9  36.4-46.3  fL


 


RDW Coefficient of Variation 14.8  11.5-14.5  %


 


Immature Granulocyte % (Auto) 0.2   %


 


Immature Granulocyte # (Auto) 0.02  0.00-0.02  K/uL


 


Prothrombin Time


  24.4


  


  9.0-12.0


SECONDS


 


Prothromb Time International


Ratio 2.2


  


  0.9-1.1  


 


 


Activated Partial


Thromboplast Time 38.5


  


  21.0-31.0


SECONDS


 


Partial Thromboplastin Ratio 1.5   


 


Sodium Level 137  136-145  mmol/L


 


Potassium Level 3.6  3.5-5.1  mmol/L


 


Chloride Level 101    mmol/L


 


Carbon Dioxide Level 32  21-32  mmol/L


 


Anion Gap 4.0  3-11  mmol/L


 


Blood Urea Nitrogen 8  7-18  mg/dl


 


Creatinine


  1.40


  


  0.60-1.40


mg/dl


 


Est Creatinine Clear Calc


Drug Dose 56.0


  


   ml/min


 


 


Estimated GFR (


American) 55.0


  


   


 


 


Estimated GFR (Non-


American 47.5


  


   


 


 


BUN/Creatinine Ratio 6.0  10-20  


 


Random Glucose 106  70-99  mg/dl


 


Calcium Level 8.4  8.5-10.1  mg/dl


 


Total Bilirubin 1.0  0.2-1  mg/dl


 


Aspartate Amino Transf


(AST/SGOT) 13


  


  15-37  U/L


 


 


Alanine Aminotransferase


(ALT/SGPT) 14


  


  12-78  U/L


 


 


Alkaline Phosphatase 60    U/L


 


Total Creatine Kinase 43    U/L


 


Creatine Kinase MB 0.9  0.5-3.6  ng/ml


 


Creatine Kinase MB Ratio 2.1  0-3.0  


 


Troponin I < 0.015  0-0.045  ng/ml


 


Pro-B-Type Natriuretic Peptide 3926  0-1800  pg/ml


 


Total Protein 6.9  6.4-8.2  gm/dl


 


Albumin 3.1  3.4-5.0  gm/dl


 


Globulin 3.8  2.5-4.0  gm/dl


 


Albumin/Globulin Ratio 0.8  0.9-2  


 


Urine Color  YELLOW  


 


Urine Appearance  CLEAR CLEAR  


 


Urine pH  6.0 4.5-7.5  


 


Urine Specific Gravity  1.017 1.000-1.030  


 


Urine Protein  1+ NEG  


 


Urine Glucose (UA)  NEG NEG  


 


Urine Ketones  TRACE NEG  


 


Urine Occult Blood  NEG NEG  


 


Urine Nitrite  NEG NEG  


 


Urine Bilirubin  NEG NEG  


 


Urine Urobilinogen  NEG NEG  


 


Urine Leukocyte Esterase  NEG NEG  


 


Urine WBC (Auto)  1-5 0-5  /hpf


 


Urine RBC (Auto)  0-4 0-4  /hpf


 


Urine Hyaline Casts (Auto)  1-5 0-5  /lpf


 


Urine Epithelial Cells (Auto)  5-10 0-5  /lpf


 


Urine Bacteria (Auto)  NEG NEG  








Microbiology Results


11/27/17 Blood Culture, Received


           Pending


11/27/17 Blood Culture, Received


           Pending





Diagnostic Radiology


                                                                               

                                                                 


Patient Name: LENNIE BURNETTE                               Unit Number:  

[id number]                  


 





 











Dictated: 11/27/17 1901


 


Transcribed: 11/27/17 1901


 


PBS


 


Printed Date/Time: [~ rep prt dt]/[~ rep prt tm]








 [~ rep ct labl] - [~ rep ct ivnm]


 





   [facility]


 Radiology Department


 [place] [address]


 [phone]





 











Dictated: 11/27/17 1901


 


Transcribed: 11/27/17 1901


 


PBS


 


Printed Date/Time: [~ rep prt dt]/[~ rep prt tm]








 [~ rep ct labl] - [~ rep ct ivnm]


 








CHEST ONE VIEW PORTABLE





CLINICAL HISTORY: 77 years-old Male presenting with EVALUATE RESPIRATORY


DISTRESS.DYSPNEA. 





TECHNIQUE: Portable upright AP view of the chest was obtained.





COMPARISON: 5/22/2017.





FINDINGS:


The patient is CAMPOS rotated. Atherosclerosis of aortic arch. Cardiac silhouette


enlarged and largely obscured along the left heart border. Extensive


opacification of the left lung suspected allowing for patient rotation.


Additionally, focal density projects over the right paratracheal region. No


pneumothorax. Osseous structures normal. Upper abdomen normal.





IMPRESSION:


1.  Limited evaluation secondary to patient rotation and portable AP technique.


Further evaluation with chest CT recommended.





2.  Cardiomegaly.





3.  Extensive left lung opacity likely consolidation with effusion.





4.  Right paratracheal density of uncertain etiology. Lymphadenopathy not


excluded.











Electronically signed by:  Cullen Leung M.D.


11/27/2017 7:03 PM





Dictated Date/Time:  11/27/2017 7:01 PM





 The status of this report is Signed.   


 Draft = Not yet reviewed or approved by Radiologist.  


 Signed = Reviewed and approved by Radiologist.


<AttendingPhy></AttendingPhy> <FamilyPhy>Roshan Chavez M.D.</FamilyPhy

> <PrimaryPhy>Roshan Chavez M.D.</PrimaryPhy> <UnitNumber>[id number]</

UnitNumber> <VisitNumber>[id number]</VisitNumber> <PatientName>LENNIE BURNETTE

</PatientName> <DateOfBirth>1940</DateOfBirth> <Location>[facility]</Location> 

<ServiceDate>11/27/17</ServiceDate> <MNE>ESINDI</MNE> <OrderingPhy>Neeraj Ross MD</OrderingPhy> <OrderingPhyMNE>f rep ord dr rosas</OrderingPhyMNE> <

DictatingPhyMNE>f rep dict dr rosas</DictatingPhyMNE> <CCListMNE>f rep ct mne</

CCListMNE> <AdmittingPhyMNE>f pt admit dr rosas</AdmittingPhyMNE> <AttendingPhyMNE

>f pt attend dr rosas</AttendingPhyMNE>


<ConsultingPhyMNE>f pt consult dr rosas</ConsultingPhyMNE> <FamilyPhyMNE>f pt fam 

dr rosas</FamilyPhyMNE> <OtherPhyMNE>f pt other dr rosas</OtherPhyMNE> <

PrimaryPhyMNE>f pt prim care dr rosas</PrimaryPhyMNE> <ReferringPhyMNE>f pt 

referring dr rosas</ReferringPhyMNE>


                                                                               

                                                                 


Patient Name: LENNIE BURNETTE                               Unit Number:  

[id number]                  


 





 











Dictated: 11/27/17 2038


 


Transcribed: 11/27/17 2038


 


PBS


 


Printed Date/Time: [~ rep prt dt]/[~ rep prt tm]








 [~ rep ct labl] - [~ rep ct ivnm]


 





   [facility]


 Radiology Department


 [place] [id number] [phone]





 











Dictated: 11/27/17 2038


 


Transcribed: 11/27/17 2038


 


PBS


 


Printed Date/Time: [~ rep prt dt]/[~ rep prt tm]








 [~ rep ct labl] - [~ rep ct ivnm]


 








(CHEST) THORAX WITHOUT





CLINICAL HISTORY: 77 years-old Male presenting with left sided effusion/mass,


abn cxr. 





TECHNIQUE: Multidetector CT imaging of the chest was performed without the use


of intravenous contrast. IV contrast: None. A dose lowering technique was used


consistent with the principles of ALARA (as low as reasonably achievable).





COMPARISON: Chest x-ray performed earlier the same day an chest CT from


3/7/2016.





CT DOSE (mGy.cm): The estimated cumulative dose is 695.59 mGy.cm.





FINDINGS:





 topogram: Extensive left basilar opacity.





On soft tissue windows, bilateral gynecomastia. Normal thyroid. Multiple


enlarged mediastinal lymph nodes measuring up to 16 mm in the short axis. These


were present on the prior exam and are stable to slightly increased in size.


Atherosclerosis of the aorta. Multichamber enlargement of the heart. Coronary


artery calcification. No pericardial or pleural effusion. Upper abdomen normal.





On lung windows, extensive left lung volume loss stable to slightly increased


from prior exam. Groundglass and reticular opacities as well as patchy solid


consolidation noted in the left lung primarily in the lingula and basal segments


of the left lower lobe. A suture margin may be present at the left lung base.


Solid peripheral nodule noted in the superior segment of the left lower lobe


measuring 8 mm new from prior. Extensive bronchial wall thickening and debris in


the left mainstem bronchus and left lung airways. Only mild bronchial wall


thickening noted in the right lung. Minimal patchy groundglass opacity noted in


the right lower lobe. Solid 5 mm pulmonary nodule in the right upper lobe


(series 4 image 118). Few scattered additional smaller nodules in the right


lung.





On bone windows, degenerative changes of the spine.





IMPRESSION:


1.  Interval increase in left lung consolidation primarily in the lingula and


basal segments with extensive bronchial wall thickening and debris in the


airways of the left lung. This is concerning for chronic aspiration/aspiration


pneumonitis. This appearance is less typical for other diagnostic


considerations, which include an atypical infection such as mycobacterium avium


intracellulare.





2.  Scattered nodularity in the lungs bilaterally. This may represent an


infectious or inflammatory etiology, however, follow-up per Fleischer Society


2017 recommendations below.





3.  Stable slight interval increase in mediastinal lymphadenopathy. Although


this may be reactive given the presence of inflammation/infection within the


lungs, follow-up should be obtained.





Please refer to below summary of Fleischner Society 2017 recommendations for


follow-up of incidental CT nodules (AIDE Braga et al. Guidelines for management


of incidental pulmonary nodules detected on CT images: From the Fleischner


Society 2017. Radiology 2017; 284: 228-243.)





SOLID NODULES





Single nodule; size < 6 mm


*  Low risk patients: No routine follow-up


*  High risk patients: Optional CT at 12 months





Single nodule; size 6-8 mm


*  Low risk patients: CT at 6-12 months, then consider CT at 18-24 months


*  High risk patients: CT at 6-12 months, then at 18-24 months





Single nodule; size > 8 mm


*  Either low or high risk patients: Considered CT at 3 months, PET/CT, or


tissue sampling





Multiple nodules; size < 6 mm


*  Low risk patients: No routine follow up


*  High risk patients: Optional CT at 12 months





Multiple nodules; size 6-8 mm


*  Low risk patients: CT at 3-6 months, then consider CT at 18-24 months


*  High risk patients: CT at 3-6 months, then at 18-24 months





Multiple nodules; size > 8 mm


*  Low risk patients: CT at 3-6 months, then consider at 18-24 months


*  High risk patients: CT at 3-6 months, then at 18-24 months








Note: These guidelines apply to incidental nodules. These guidelines do not


apply to patients younger than 35 years, immunocompromised patients, or patients


with cancer.


*  Low risk patients: Minimal or absent history of smoking and/or other known


risk factors


*  High risk patients: History of smoking, exposure to other carcinogens,


emphysema, fibrosis, upper lobe location, family history of lung cancer, etc. 


*  If a nodule up to 8 mm is partly solid or is ground glass, further follow-up


is required after 24 months to exclude possible slow growing adenocarcinoma.








SUBSOLID NODULES





Single ground-glass nodule


*  Nodule size < 6 mm: No routine follow-up


*  Nodule size > or = 6 mm: CT at 6-12 months to confirm persistence, then CT


every 2 years until 5 years





Single part-solid nodule


*  Nodule size < 6 mm: No routine follow-up


*  Nodules size > or = 6 mm: CT at 3-6 months to confirm persistence. If


unchanged and solid component remains < 6 mm, annual CT should be performed for


5 years





Multiple nodules


*  Nodule size < 6 mm: CT at 3-6 months. If stable, consider CT at 2 and 4


years.


*  Nodules size > or = 6 mm: CT at 3-6 months. Subsequent management based on


the most suspicious nodule(s)











       











Electronically signed by:  Cullen Leung M.D.


11/27/2017 8:48 PM





Dictated Date/Time:  11/27/2017 8:38 PM





 The status of this report is Signed.   


 Draft = Not yet reviewed or approved by Radiologist.  


 Signed = Reviewed and approved by Radiologist.


<AttendingPhy></AttendingPhy> <FamilyPhy>Roshan Chavez M.D.</FamilyPhy

> <PrimaryPhy>Roshan Chavez M.D.</PrimaryPhy> <UnitNumber>[id number]</

UnitNumber> <VisitNumber>[id number]</VisitNumber> <PatientName>LENNIE BURNETTE

</PatientName> <DateOfBirth>1940</DateOfBirth> <Location>C.[facility]</Location> 

<ServiceDate>11/27/17</ServiceDate> <MNE>ESINDI</MNE> <OrderingPhy>Neeraj Ross MD</OrderingPhy> <OrderingPhyMNE>f rep ord dr rosas</OrderingPhyMNE> <

DictatingPhyMNE>f rep dict dr rosas</DictatingPhyMNE> <CCListMNE>f rep ct mne</

CCListMNE> <AdmittingPhyMNE>f pt admit dr rosas</AdmittingPhyMNE> <AttendingPhyMNE

>f pt attend dr rosas</AttendingPhyMNE>


<ConsultingPhyMNE>f pt consult dr rosas</ConsultingPhyMNE> <FamilyPhyMNE>f pt fam 

dr rosas</FamilyPhyMNE> <OtherPhyMNE>f pt other dr rosas</OtherPhyMNE> <

PrimaryPhyMNE>f pt prim care dr rosas</PrimaryPhyMNE> <ReferringPhyMNE>f pt 

referring dr rosas</ReferringPhyMNE>





EKG


EKG shows atrial fibrillation at 94 bpm, incomplete right bundle branch block, 

no acute ST-T changes





Impression


Assessment and Plan


Lingular and left lower lobe pneumonia/bilateral nodules, mediastinal 

lymphadenopathy--


Admit to the telemetry unit for close oxygen monitoring.


Place on vancomycin IV, cefepime IV and levofloxacin IV.


Guaifenesin extended release 600 mg by mouth twice a day


Xopenex with Atrovent nebulizer every 6 hours while awake and every 2 hours 

when necessary


Solu-Medrol 30 mg IV every 8 hours


Nasal cannula 3 L oxygen titrated to keep pulse ox greater than or equal to 92%.


Hold Dulera.


Continue montelukast


Consult his pulmonologist Dr. Abraham.





Atrial fibrillation/hypertension/chronic anticoagulation--


Continue metoprolol tartrate 100 mg by mouth twice a day.


Warfarin 2 mg by mouth daily with daily PT/INR.


Will need to be held and or possibly reversed if bronchoscopy is to be 

performed.





Peripheral neuropathy--


Continue gabapentin 600 mg by mouth 3 times a day.





Glaucoma--


Continue Lumigan





Insomnia--


Continue trazodone when necessary





Level of Care


Telemetry





Advanced Directives


Existing Advance Directive:  No


Existing Living Will:  No


Existing Power of :  No





Resuscitation Status


FULL RESUSCITATION





VTE Prophylaxis


VTE Risk Assessment Done? Y/N:  Yes


Risk Level:  Moderate


Given or contraindicated:  Warfarin (Coumadin)

## 2017-11-28 NOTE — CLINICAL DOCUMENTATION QUERY
SARMAD 1 OF 3            **** CLINICAL DOCUMENTATION QUERY****





Ms. LIMA, 





In your clinical opinion is this patient being managed for:

    

                        (  ) Possible aspiration pneumonia 

                        (  ) Not Agree



                        (x  ) Other explanation of clinical findings (Please Explain) - 
Mucous plugging causing pneumonia

                        (  ) Unable to determine (Please Define)

                        (  ) Need to Discuss

                        



The medical record reflects the following clinical findings, treatment, and risk factors.  
  



Clinical Indicators:76 yo male presenting with constant dyspnea. CXR indicates L lung 
consolidation concerning for chronic aspiration/aspiration pneumonitis. 

Treatment:tele, vancomycin IV, cefepime IV and levofloxacin IV, O2 support, nebs, 
pulmonary consult

Risk Factors: age, COPD

_____________________________________________

QUERY 2 OF 3

In your clinical opinion is this patient being managed for:

    

                        (x  ) Chronic respiratory failure

                        (  ) Not Agree



                        (  ) Other explanation of clinical findings (Please Explain)

                        (  ) Unable to determine (Please Define)

                        (  ) Need to Discuss

                        



The medical record reflects the following clinical findings, treatment, and risk factors.  
  



Clinical Indicators: Pt noted to wear chronic home O2 therapy. 

Treatment: Chronic treatment includes: home O2 therapy, atrovent nebs, combivent, dulera, 
and singulair

Risk Factors: COPD

_____________________________________________

QUERY 3 OF 3

In your clinical opinion is this patient being managed for:

    

                        (  ) COPD exacerbation

                        ( x ) Not Agree - see above



                        (  ) Other explanation of clinical findings (Please Explain)

                        (  ) Unable to determine (Please Define)

                        (  ) Need to Discuss

                        



The medical record reflects the following clinical findings, treatment, and risk factors.  
  



Clinical Indicators:Documentation reflects pt with wheezing for the past several days, 
needing increased O2 support at home. Given albuterol nebs at PCP's office without 
improvement. 

Treatment: O2 support, IV solumedrol, pulmonary consult, nebs, IV vancomycin, IV cefepime, 
IV levaquin, IV zosyn

Risk Factors: COPD, pneumonia/aspiration pneumonia



Please clarify and document your clinical opinion in the progress notes and discharge 
summary. Terms such as "probable", "suspected", "likely", "questionable", "possible", or 
"still to be ruled out" are acceptable. 



*****IF IN AGREEMENT, YOU MUST DOCUMENT ABOVE DIAGNOSTIC STATEMENT IN DAILY PROGRESS NOTES 
AND DISCHARGE SUMMARY. This document is not part of the patient's record.*****

Thank You, Felipa Lay [phone]

## 2017-11-28 NOTE — EMERGENCY ROOM VISIT NOTE
History


Report prepared by Leandra:  Jenn Pakr


Under the Supervision of:  Dr. Neeraj Ross M.D.


First contact with patient:  18:15


Chief Complaint:  SHORTNESS OF BREATH


Stated Complaint:  SOB


Nursing Triage Summary:  


Cough productive of blood tinged sputum beginning 16 hours PTA. Was seen at 

[facility] today and given 1 albuterol nebulizer treatment without relief of 


dyspnea and cough. Increased home O2 from 2L to 3L. Sent from [facility] office via 


EMS at request of primary physician.





History of Present Illness


The patient is a 77 year old male who presents to the Emergency Room with 

complaints of constant shortness of breath beginning yesterday. The patient 

reports having a productive cough with blood tinged sputum beginning 16 hours 

ago. The patient went to his PCP at [facility] today. While there, the 

patient was and given two albuterol nebulizer treatments without any relief. At 

home, the patient increased his nasal cannula oxygen from 2L to 3L. The patient 

was sent from [facility] office via EMS at request of primary physician. The patient 

reports blurry vision which has been a ongoing problem that he is following up 

with a physician for. The patient denies any recent steroid use. The patient 

states he has felt fatigued and wheezy for the past couple days. The patient 

has a history of pneumonia. He reports having his flu shot. The patient is on 

Warfarin for atrial fibrillation. Pt denies LOC, headache, fevers, chills, 

diaphoresis, neck pain, chest pain, nausea, vomiting, abdominal pain, back pain

, melena, hematochezia, urinary symptoms, numbness, weakness, lymphadenopathy, 

rash, or other complaints.





   Source of History:  patient


   Onset:  yesterday


   Position:  other (global)


   Quality:  other (shortness of breath)


   Timing:  constant


   Associated Symptoms:  + cough, + SOB, + fatigue, No chest pain, No abdominal 

pain, No back pain, No urinary symptoms





Review of Systems


See HPI for pertinent positives and negatives.  A total of ten systems were 

reviewed and were otherwise negative.





Past Medical & Surgical


Medical Problems:


(1) ANTICOAGULANTS,LT,CURRENT USE


(2) ATRIAL FIBRILLATION


(3) Bacterial pneumonia


(4) Bacterial pneumonia, unspecified


(5) Benign essential hypertension


(6) CHRONIC KIDNEY DISEASE, STAGE III (MODERATE)


(7) COPD (chronic obstructive pulmonary disease)








Family History





Diabetes mellitus


FHx: kidney disease


Heart disease


Hypertension


Kidney stone





Social History


Smoking Status:  Never Smoker


Alcohol Use:  none


Drug Use:  none


Marital Status:  


Housing Status:  lives with family


Occupation Status:  retired





Current/Historical Medications


Scheduled


Bethanechol Chloride (Bethanechol Chloride), 50 MG PO BID


Bimatoprost (Lumigan), 1 DROP OPB QPM


Cholecalciferol (Vitamin D3), 2,000 UNITS PO DAILY


Gabapentin (Gabapentin), 600 MG PO TID


Ipratropium Bromide (Atrovent 0.02% Soln), 1 VIAL NEB TID


Ipratropium-Albuterol (Combivent Respimat), 2 PUFFS INH QID


Metoprolol Tartrate (Metoprolol Tartrate), 100 MG PO BID


Mometasone Furoate-Formoterol (Dulera 200/5 Mcg), 2 PUFFS INH BID


Montelukast Sod (Montelukast Sodium), 10 MG PO DAILY


Warfarin Sodium (Warfarin Sodium), 2 MG PO DAILY





Scheduled PRN


Trazodone Hcl (Trazodone), 50 MG PO HS PRN for Sleep





Allergies


Coded Allergies:  


     No Known Allergies (Unverified , 11/27/17)





Physical Exam


Vital Signs











  Date Time  Temp Pulse Resp B/P (MAP) Pulse Ox O2 Delivery O2 Flow Rate FiO2


 


11/27/17 21:59  102      


 


11/27/17 21:46  92 18 174/98 97 Room Air  


 


11/27/17 19:37      Nasal Cannula 2.0 


 


11/27/17 19:37  88 21 165/95  Nasal Cannula 2.0 


 


11/27/17 18:00      Room Air  


 


11/27/17 18:00  100      


 


11/27/17 17:34 36.4 105 20 165/95 97 Nasal Cannula 2.0 


 


11/27/17 17:34     94 Nasal Cannula 2.0 











Physical Exam


GENERAL: Awake, alert, fatigued and dyspneic-appearing, in no distress


HENT: Normocephalic, atraumatic. Oropharynx unremarkable.


EYES: Normal conjunctiva. Sclera non-icteric.


NECK: Supple. No nuchal rigidity. FROM. No JVD.


RESPIRATORY: Expiratory wheezes of right ride


CARDIAC: Regular rate, normal rhythm. Extremities warm and well perfused. 

Pulses equal.


ABDOMEN: Soft, non-distended. No tenderness to palpation. No rebound or 

guarding. No masses.


RECTAL: Deferred.


MUSCULOSKELETAL: Chest examination reveals no tenderness. The back is 

symmetrical on inspection without obvious abnormality. There is no CVA 

tenderness to palpation. No joint edema. 


LOWER EXTREMITIES: Calves are equal size bilaterally and non-tender. No edema. 

Chronic venous discoloration of legs. 


NEURO: Normal sensorium. No sensory or motor deficits noted. 


SKIN: No rash or jaundice noted.





Medical Decision & Procedures


ER Provider


Diagnostic Interpretation:


Radiology results as stated below per my review and radiologist interpretation: 








CHEST ONE VIEW PORTABLE.





FINDINGS:


The patient is CAMPOS rotated. Atherosclerosis of aortic arch. Cardiac silhouette


enlarged and largely obscured along the left heart border. Extensive


opacification of the left lung suspected allowing for patient rotation.


Additionally, focal density projects over the right paratracheal region. No


pneumothorax. Osseous structures normal. Upper abdomen normal.





IMPRESSION:


1.  Limited evaluation secondary to patient rotation and portable AP technique.


Further evaluation with chest CT recommended.





2.  Cardiomegaly.





3.  Extensive left lung opacity likely consolidation with effusion.





4.  Right paratracheal density of uncertain etiology. Lymphadenopathy not


excluded.





Electronically signed by:  Cullen Leung M.D.





(CHEST) THORAX WITHOUT





FINDINGS:





 topogram: Extensive left basilar opacity.





On soft tissue windows, bilateral gynecomastia. Normal thyroid. Multiple


enlarged mediastinal lymph nodes measuring up to 16 mm in the short axis. These


were present on the prior exam and are stable to slightly increased in size.


Atherosclerosis of the aorta. Multichamber enlargement of the heart. Coronary


artery calcification. No pericardial or pleural effusion. Upper abdomen normal.





On lung windows, extensive left lung volume loss stable to slightly increased


from prior exam. Groundglass and reticular opacities as well as patchy solid


consolidation noted in the left lung primarily in the lingula and basal segments


of the left lower lobe. A suture margin may be present at the left lung base.


Solid peripheral nodule noted in the superior segment of the left lower lobe


measuring 8 mm new from prior. Extensive bronchial wall thickening and debris in


the left mainstem bronchus and left lung airways. Only mild bronchial wall


thickening noted in the right lung. Minimal patchy groundglass opacity noted in


the right lower lobe. Solid 5 mm pulmonary nodule in the right upper lobe


(series 4 image 118). Few scattered additional smaller nodules in the right


lung.





On bone windows, degenerative changes of the spine.





IMPRESSION:


1.  Interval increase in left lung consolidation primarily in the lingula and


basal segments with extensive bronchial wall thickening and debris in the


airways of the left lung. This is concerning for chronic aspiration/aspiration


pneumonitis. This appearance is less typical for other diagnostic


considerations, which include an atypical infection such as mycobacterium avium


intracellulare.





2.  Scattered nodularity in the lungs bilaterally. This may represent an


infectious or inflammatory etiology, however, follow-up per Fleischer Society


2017 recommendations below.





3.  Stable slight interval increase in mediastinal lymphadenopathy. Although


this may be reactive given the presence of inflammation/infection within the


lungs, follow-up should be obtained.





Please refer to below summary of Fleischner Society 2017 recommendations for


follow-up of incidental CT nodules (H Maria Luz et al. Guidelines for management


of incidental pulmonary nodules detected on CT images: From the Fleischner


Society 2017. Radiology 2017; 284: 228-243.)





SOLID NODULES





Single nodule; size < 6 mm


*  Low risk patients: No routine follow-up


*  High risk patients: Optional CT at 12 months





Single nodule; size 6-8 mm


*  Low risk patients: CT at 6-12 months, then consider CT at 18-24 months


*  High risk patients: CT at 6-12 months, then at 18-24 months





Single nodule; size > 8 mm


*  Either low or high risk patients: Considered CT at 3 months, PET/CT, or


tissue sampling





Multiple nodules; size < 6 mm


*  Low risk patients: No routine follow up


*  High risk patients: Optional CT at 12 months





Multiple nodules; size 6-8 mm


*  Low risk patients: CT at 3-6 months, then consider CT at 18-24 months


*  High risk patients: CT at 3-6 months, then at 18-24 months





Multiple nodules; size > 8 mm


*  Low risk patients: CT at 3-6 months, then consider at 18-24 months


*  High risk patients: CT at 3-6 months, then at 18-24 months








Note: These guidelines apply to incidental nodules. These guidelines do not


apply to patients younger than 35 years, immunocompromised patients, or patients


with cancer.


*  Low risk patients: Minimal or absent history of smoking and/or other known


risk factors


*  High risk patients: History of smoking, exposure to other carcinogens,


emphysema, fibrosis, upper lobe location, family history of lung cancer, etc. 


*  If a nodule up to 8 mm is partly solid or is ground glass, further follow-up


is required after 24 months to exclude possible slow growing adenocarcinoma.








SUBSOLID NODULES





Single ground-glass nodule


*  Nodule size < 6 mm: No routine follow-up


*  Nodule size > or = 6 mm: CT at 6-12 months to confirm persistence, then CT


every 2 years until 5 years





Single part-solid nodule


*  Nodule size < 6 mm: No routine follow-up


*  Nodules size > or = 6 mm: CT at 3-6 months to confirm persistence. If


unchanged and solid component remains < 6 mm, annual CT should be performed for


5 years





Multiple nodules


*  Nodule size < 6 mm: CT at 3-6 months. If stable, consider CT at 2 and 4


years.


*  Nodules size > or = 6 mm: CT at 3-6 months. Subsequent management based on


the most suspicious nodule(s)





Electronically signed by:  Cullen Leung M.D.





Laboratory Results


11/27/17 18:51








Red Blood Count 4.31, Mean Corpuscular Volume 93.7, Mean Corpuscular Hemoglobin 

30.9, Mean Corpuscular Hemoglobin Concent 32.9, Mean Platelet Volume 9.6, 

Neutrophils (%) (Auto) 79.1, Lymphocytes (%) (Auto) 12.1, Monocytes (%) (Auto) 

5.5, Eosinophils (%) (Auto) 2.7, Basophils (%) (Auto) 0.4, Neutrophils # (Auto) 

7.61, Lymphocytes # (Auto) 1.17, Monocytes # (Auto) 0.53, Eosinophils # (Auto) 

0.26, Basophils # (Auto) 0.04





11/27/17 18:51

















Test


  11/27/17


18:51 11/27/17


19:00


 


White Blood Count


  9.63 K/uL


(4.8-10.8) 


 


 


Red Blood Count


  4.31 M/uL


(4.7-6.1) 


 


 


Hemoglobin


  13.3 g/dL


(14.0-18.0) 


 


 


Hematocrit 40.4 % (42-52)  


 


Mean Corpuscular Volume


  93.7 fL


() 


 


 


Mean Corpuscular Hemoglobin


  30.9 pg


(25-34) 


 


 


Mean Corpuscular Hemoglobin


Concent 32.9 g/dl


(32-36) 


 


 


Platelet Count


  135 K/uL


(130-400) 


 


 


Mean Platelet Volume


  9.6 fL


(7.4-10.4) 


 


 


Neutrophils (%) (Auto) 79.1 %  


 


Lymphocytes (%) (Auto) 12.1 %  


 


Monocytes (%) (Auto) 5.5 %  


 


Eosinophils (%) (Auto) 2.7 %  


 


Basophils (%) (Auto) 0.4 %  


 


Neutrophils # (Auto)


  7.61 K/uL


(1.4-6.5) 


 


 


Lymphocytes # (Auto)


  1.17 K/uL


(1.2-3.4) 


 


 


Monocytes # (Auto)


  0.53 K/uL


(0.11-0.59) 


 


 


Eosinophils # (Auto)


  0.26 K/uL


(0-0.5) 


 


 


Basophils # (Auto)


  0.04 K/uL


(0-0.2) 


 


 


RDW Standard Deviation


  50.9 fL


(36.4-46.3) 


 


 


RDW Coefficient of Variation


  14.8 %


(11.5-14.5) 


 


 


Immature Granulocyte % (Auto) 0.2 %  


 


Immature Granulocyte # (Auto)


  0.02 K/uL


(0.00-0.02) 


 


 


Prothrombin Time


  24.4 SECONDS


(9.0-12.0) 


 


 


Prothromb Time International


Ratio 2.2 (0.9-1.1) 


  


 


 


Activated Partial


Thromboplast Time 38.5 SECONDS


(21.0-31.0) 


 


 


Partial Thromboplastin Ratio 1.5  


 


Anion Gap


  4.0 mmol/L


(3-11) 


 


 


Est Creatinine Clear Calc


Drug Dose 56.0 ml/min 


  


 


 


Estimated GFR (


American) 55.0 


  


 


 


Estimated GFR (Non-


American 47.5 


  


 


 


BUN/Creatinine Ratio 6.0 (10-20)  


 


Calcium Level


  8.4 mg/dl


(8.5-10.1) 


 


 


Total Bilirubin


  1.0 mg/dl


(0.2-1) 


 


 


Aspartate Amino Transf


(AST/SGOT) 13 U/L (15-37) 


  


 


 


Alanine Aminotransferase


(ALT/SGPT) 14 U/L (12-78) 


  


 


 


Alkaline Phosphatase


  60 U/L


() 


 


 


Total Creatine Kinase


  43 U/L


() 


 


 


Creatine Kinase MB


  0.9 ng/ml


(0.5-3.6) 


 


 


Creatine Kinase MB Ratio 2.1 (0-3.0)  


 


Troponin I


  < 0.015 ng/ml


(0-0.045) 


 


 


Pro-B-Type Natriuretic Peptide


  3926 pg/ml


(0-1800) 


 


 


Total Protein


  6.9 gm/dl


(6.4-8.2) 


 


 


Albumin


  3.1 gm/dl


(3.4-5.0) 


 


 


Globulin


  3.8 gm/dl


(2.5-4.0) 


 


 


Albumin/Globulin Ratio 0.8 (0.9-2)  


 


Urine Color  YELLOW 


 


Urine Appearance  CLEAR (CLEAR) 


 


Urine pH  6.0 (4.5-7.5) 


 


Urine Specific Gravity


  


  1.017


(1.000-1.030)


 


Urine Protein  1+ (NEG) 


 


Urine Glucose (UA)  NEG (NEG) 


 


Urine Ketones  TRACE (NEG) 


 


Urine Occult Blood  NEG (NEG) 


 


Urine Nitrite  NEG (NEG) 


 


Urine Bilirubin  NEG (NEG) 


 


Urine Urobilinogen  NEG (NEG) 


 


Urine Leukocyte Esterase  NEG (NEG) 


 


Urine WBC (Auto)  1-5 /hpf (0-5) 


 


Urine RBC (Auto)  0-4 /hpf (0-4) 


 


Urine Hyaline Casts (Auto)  1-5 /lpf (0-5) 


 


Urine Epithelial Cells (Auto)


  


  5-10 /lpf


(0-5)


 


Urine Bacteria (Auto)  NEG (NEG) 





Laboratory results reviewed by me





Medications Administered











 Medications


  (Trade)  Dose


 Ordered  Sig/Marielle


 Route  Start Time


 Stop Time Status Last Admin


Dose Admin


 


 Methylprednisolone


 Sodium Succinate


  (Solu-Medrol IV)  125 mg  NOW  STAT


 IV  11/27/17 18:25


 11/27/17 18:26 DC 11/27/17 19:45


125 MG


 


 Albuterol/


 Ipratropium


  (Duoneb)  3 ml  NOW  STAT


 INH  11/27/17 18:25


 11/27/17 18:26 DC 11/27/17 19:37


3 ML


 


 Piperacillin Sod/


 Tazobactam Sod


  (Zosyn Iv)  4.5 gm  NOW  STAT


 IV  11/27/17 20:57


 11/27/17 20:59 DC 11/27/17 21:41


4.5 GM


 


 Levofloxacin


  (Levaquin / D5W)  750 mg  NOW  STAT


 IV  11/27/17 20:57


 11/27/17 20:59 DC 11/27/17 21:41


750 MG











ECG


Indication:  SOB/dyspnea


Rate (beats per minute):  94


Rhythm:  atrial fibrillation


Findings:  RBBB (incomplete), no acute ischemic change, no ectopy





ED Course


1818: The patient was evaluated in room B3A. A complete history and physical 

exam was performed.





1825: Ordered DuoNeb 3 ml INH, Solu-Medrol  mg IV.





1947: I updated the patient on his chest X-ray results. 





2057: Ordered Levofloxacin 750 mg IV, Zosyn IV 4.5 gm IV. 





2059: I updated the patient on his CT results. 





2105: Discussed the patient's case with Dr. Motley. The patient will 

be evaluated for further treatment and disposition.





Medical Decision


Triage Nursing notes reviewed.


The patient's presentation and history were concerning for sob.





Etiologies such as  pneumonia, COPD, reactive airway disease, CHF, cardiac 

ischemia, pulmonary embolism, pneumothorax, musculoskeletal, infections, 

gastrointestinal, as well as others were entertained.  





The patient was evaluated.  He was wheezing on examination which was worse on 

the right side.  The patient was given a nebulizer treatment as well as Solu-

Medrol.  Chest x-ray was performed and was abnormal as above.  The patient had 

a mild anemia on CBC but no leukocytosis.  Chemistry panel, LFTs, troponin and 

urinalysis were unremarkable.  BNP was elevated but was just slightly more than 

prior.  Radiology recommended CT imaging of the chest and this was performed.  

He has consolidation, exploding, and volume loss on the left side.  This is 

concerning.  Radiology noted the possibility of aspiration given the CT 

appearance.  The patient was administered Zosyn and Levaquin for antibiotic 

coverage due to the consolidation and CT findings.  He was reassessed and was 

stable.  Consultation was with internal medicine.  The patient was evaluated in 

the Emergency Room for further management.





Medication Reconcilliation


Current Medication List:  was personally reviewed by me





Blood Pressure Screening


Patient's blood pressure:  Elevated blood pressure


Blood pressure disposition:  Referred to PCP (evaluated by hospitalist )





Consults


Time Called:  2105


Consulting Physician:  Dr. Motley


Returned Call:  2105


Discussed the patient's case. The patient will be evaluated for further 

treatment and disposition.





Impression





 Primary Impression:  


 SOB (shortness of breath)


 Additional Impressions:  


 Pneumonia involving left lung


 Wheezing





Scribe Attestation


The scribe's documentation has been prepared under my direction and personally 

reviewed by me in its entirety. I confirm that the note above accurately 

reflects all work, treatment, procedures, and medical decision making performed 

by me.





Departure Information


Dispostion


Being Evaluated By Hospitalist





Referrals


Roshan Chavez M.D. (PCP)





Patient Instructions


My [facility]





Problem Qualifiers

## 2017-11-28 NOTE — CLINICAL DOCUMENTATION QUERY
QUERY 1 OF 3            **** CLINICAL DOCUMENTATION QUERY****





Dr. COLLIER, 



In your clinical opinion is this patient being managed for:

    

                        (x  ) Possible aspiration pneumonia 

                        (  ) Not Agree



                        (  ) Other explanation of clinical findings (Please Explain)

                        (  ) Unable to determine (Please Define)

                        (  ) Need to Discuss

                        



The medical record reflects the following clinical findings, treatment, and risk factors.  
  



Clinical Indicators:76 yo male presenting with constant dyspnea. CXR indicates L lung 
consolidation concerning for chronic aspiration/aspiration pneumonitis. 

Treatment:tele, vancomycin IV, cefepime IV and levofloxacin IV, O2 support, nebs, 
pulmonary consult

Risk Factors: age, COPD

__________________________________________

QUERY 2 OF 3

In your clinical opinion is this patient being managed for:

    

                        ( x ) Chronic respiratory failure

                        (  ) Not Agree



                        (  ) Other explanation of clinical findings (Please Explain)

                        (  ) Unable to determine (Please Define)

                        (  ) Need to Discuss

                        



The medical record reflects the following clinical findings, treatment, and risk factors.  
  



Clinical Indicators: Pt noted to wear chronic home O2 therapy. 

Treatment: Chronic treatment includes: home O2 therapy, atrovent nebs, combivent, dulera, 
and singulair

Risk Factors: COPD

__________________________________________

QUERY 3 OF 3

In your clinical opinion is this patient being managed for:

    

                        ( x ) COPD exacerbation

                        (  ) Not Agree



                        (  ) Other explanation of clinical findings (Please Explain)

                        (  ) Unable to determine (Please Define)

                        (  ) Need to Discuss

                        



The medical record reflects the following clinical findings, treatment, and risk factors.  
  



Clinical Indicators:Documentation reflects pt with wheezing for the past several days, 
needing increased O2 support at home. Given albuterol nebs at PCP's office without 
improvement. 

Treatment: O2 support, IV solumedrol, pulmonary consult, nebs, IV vancomycin, IV cefepime, 
IV levaquin, IV zosyn

Risk Factors: COPD, pneumonia/aspiration pneumonia



Please clarify and document your clinical opinion in the progress notes and discharge 
summary. Terms such as "probable", "suspected", "likely", "questionable", "possible", or 
"still to be ruled out" are acceptable. 



*****IF IN AGREEMENT, YOU MUST DOCUMENT ABOVE DIAGNOSTIC STATEMENT IN DAILY PROGRESS NOTES 
AND DISCHARGE SUMMARY. This document is not part of the patient's record.*****

Thank You, Felipa Lay [phone]

## 2017-11-28 NOTE — PULMONARY CONSULTATION
DATE OF CONSULTATION:  2017

 

TIME:  10:20 a.m.

 

REPORT OF CONSULTATION:  The patient was seen in room 278, bed 2.  He is a

pleasant 77-year-old male who was admitted yesterday.  The patient had been

feeling reasonably well on .  He was planning on going hunting

on the morning of the .  He awakened and did not feel good.  He states he

was very short of breath.  He was fatigued more than normal.  He noticed

increased wheezing.  He coughed up some sputum that he thought was blood

tinged.  He states it actually was pinkish in color.  He was coughing more

than normal.  He did have some sweating at night.  He states he has had some

night sweats for a year or two a couple of times per week.  The patient told

me that he has lost about 30 pounds in a year.  However, I went through the

outpatient visits and he has lost about 4 pounds compared with 1 year ago. 

However, he has lost 27 pounds compared with 3 years ago.  Thus, it has been

more gradual.

 

Pertinent history is that in approximately , he had surgery for removal

of a lesion in the left lower lobe that turned out to be benign.  He thinks

they did not take the entire lobe out but he was not sure.  This was done at

[facility].  He has had some chronic volume loss in the left lung

over the last several years.  He has had recurring bouts of pneumonia in the

left lung.  One occurred in , one in , and once in .  The patient

carries a history of asthma or COPD, although his pulmonary functions which

were last done in  looked to be restrictive in nature.  He did have a

decreased diffusion capacity at that time of 46%.  He carries a history of

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

their boilers which were covered with asbestos.  He was never diagnosed with

asbestosis definitively, however.

 

He is feeling much better today.  He is less short of breath.  He states that

he has phlegm which is coming up, but he swallows it instead of spitting it

out.  He has not noticed any more hemoptysis.  It should be noted that he is

on warfarin, which certainly could contribute to hemoptysis.

 

In addition to the above-mentioned pulmonary problems in the past, he does

have a history of several lung nodules.  He also has had mediastinal

adenopathy in the past.

 

PAST SURGICAL HISTORY:

1.  Left lung resection as noted.

2.  TUR of the prostate.

 

PAST MEDICAL HISTORY:

1.  Atrial fibrillation.

2.  Hypertension.

3.  Hyperlipidemia.

4.  Chronic kidney disease stage III.

5.  BPH.

6.  Glaucoma

7.  Basal cell carcinoma.

 

SOCIAL HISTORY:  Tobacco never.  ETOH -- heavy in the past but none for 20

years.

 

ALLERGIES:  CIPRO WHICH CAUSES NAUSEA.

 

FAMILY HISTORY:  Mother  at age 72 of MI.  Father  age 92 and

 in a motor vehicle accident.  Sister  of CVA.

 

MEDICATIONS:  At home:

1.  Bethanechol 50 mg b.i.d.

2.  Lumigan ophthalmic solution.

3.  Vitamin D3.

4.  Gabapentin 600 mg t.i.d.

5.  Combivent Respimat which he typically takes 1 puff 3 times per day.

6.  Nebulizer solutions including ipratropium, levalbuterol, and budesonide. 

He does not take these regularly.

7.  Dulera 200/5 two puffs b.i.d.

8.  Montelukast 10 mg daily.

9.  Trazodone 50 mg at bedtime p.r.n.

10.  Coumadin, most recently 2 mg daily.

 

REVIEW OF SYSTEMS:  Negative except for the above-mentioned complaints.  Ten

systems were reviewed.

 

PHYSICAL EXAMINATION:

GENERAL:  The patient is a 77-year-old male who is very pleasant.  He was

cooperative, alert and oriented.  He was in no distress.

VITAL SIGNS:  Temperature is 36.7.

HEENT:  The patient's eyes are very sensitive to light.  It was difficult to

evaluate.  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  Mouth exam was

unremarkable.

NECK:  Palpation of the neck reveals no lymph nodes.

HEART:  Cardiac rate is 98 per minute.  The rhythm is irregular and

consistent with atrial fibrillation.  Blood pressure is 162/105.

LUNGS:  Auscultation of the lung fields revealed severely diminished breath

sounds on the left compared with the right.  There were rales heard

predominantly on the left side.  The aeration overall was poor though. 

Saturation was 96% on 2 liters.

ABDOMEN:  Soft.  Bowel sounds were present.  There was no tenderness to

palpation or definite mass.

EXTREMITIES:  Showed varicose veins in the lower extremities.  There was no

cyanosis, clubbing or edema.

 

The patient's chest x-ray was a portable view and the patient was rotated. 

There was extensive opacification of the left side of the chest.  This

reflected a change from prior, but was a poor quality x-ray.  He then had a

CAT scan of the chest done.  This showed left lung consolidation in the

lingula and in the apparent basal segments of the lower lobe.  There was

bronchial wall thickening.  The possibility of aspiration could not be

excluded.  The patient did admit that he has some degree of dysphagia.  He

has scattered nodules in the lungs bilaterally.  Over the course of time, I

do not think there has been dramatic change.  There was one reported in the

left lower lobe that reportedly was new, but from prior reports it may have

been present previously.  He has mediastinal adenopathy and this has

increased somewhat.

 

LABORATORY DATA:  White count was 9.63.  Hemoglobin 13.3.  Platelets 135,000.

 He does have a prior history of decreased platelet counts.  INR was 2.2. 

PTT was 38.5.  Urinalysis showed +1 protein.  There was only 1-5 WBCs. 

Electrolytes show sodium 137, potassium 3.6, chloride 101, bicarb 32.  BUN

was 8 with a creatinine of 1.4 yesterday.  Today, the creatinine is up to

1.57.  Calcium is 8.4.  Liver functions were normal.  BNP was 3926.  Blood

cultures are pending.

 

IMPRESSION:

1.  Left lung pneumonia.

2.  Significant atelectasis and volume loss, left lung.

3.  Mediastinal adenopathy.

4.  Multiple lung nodules.

5.  Asthma/chronic obstructive pulmonary disease by history.

6.  Asbestos exposure.

7.  Elevated BNP.

8.  Small amount of hemoptysis.

9.  Dysphagia.

 

COMMENTS AND RECOMMENDATIONS:  The patient has a severely abnormal CAT scan. 

There is poor aeration.  This could be a combination of factors related to

his prior surgery as well as having significant mucus.  I cannot entirely

exclude aspiration.  He has had some dysphagia.  On occasion he will feel

that his food sticks and it is usually higher up.  He has never had endoscopy

for that.  He does feel much better today.  He does have a history of

recurring pneumonia in the left lung.  I spoke to him about the possible need

for bronchoscopy.  He indicated he does not want to do anything like that at

present.  He already has a followup appointment to see me in 3 weeks and he

would like to wait and just see how everything unfolds.  He also mentioned

that he had previously had a complication with sedation for a proposed

shoulder surgery.  He cannot recall when that was, but they gave him

something and he had an untoward effect from either sedation or anesthesia. 

He was not sure what he had received.  They had to cancel the procedure and

never followed through.

 

The patient does have a history of chronic kidney disease.  He does follow

with nephrology.  In light of this, I am concerned about giving him the

vancomycin.  We are treating him as either aspiration or community acquired

pneumonia.  Either way, I think we likely do not need the vancomycin at

present but would continue with the levofloxacin and cefepime.  They should

ideally have less effect on his kidneys.  I would like to collect some

sputums.  We will start incentive spirometry and a flutter valve.  As he

improves, would give consideration to doing a barium swallow study. 

Alternatively, he could have a speech swallow evaluation.  Would continue the

steroids for now.  When the patient is home, I would encourage him to use his

nebulizer treatments.  He prefers to use the inhalers if he can get them paid

for because of the time factor.

 

Thank you for asking me to assist in his care.

## 2017-11-28 NOTE — PHARMACY PROGRESS NOTE
Pharmacy Abx Initial Consult


Date of Service


Nov 28, 2017.





Pharmacy Dosing Scope


Date of Consult:  11/27/17


Consultation requested by: Dr. Reyes





Pharmacy is consulted to initiate IV VANCOMYCIN and CEFEPIME therapy, order 

appropriate labs and adjust drug dose/frequency.





Subjective


The patient is a 77 year old male admitted on Nov 27, 2017 at 22:24 for SOB, 

cough and wheezing.





Objective


Height (Feet):  6


Height (Inches):  0.00


Weight (Kilograms):  110.600


Vital Signs (Past 12Hrs)





Vital Signs Past 12 Hours








  Date Time  Temp Pulse Resp B/P (MAP) Pulse Ox O2 Delivery O2 Flow Rate FiO2


 


11/28/17 08:30      Nasal Cannula 2.0 


 


11/28/17 07:26 36.7 97 20 162/105 (124) 96 Nasal Cannula 2.0 


 


11/28/17 07:14  64 14  96 Nasal Cannula 2.0 


 


11/28/17 04:00      Nasal Cannula 2.0 


 


11/28/17 01:49  92 14  96 Nasal Cannula 2.0 


 


11/27/17 23:55 36.4 108 16 164/102 91 Nasal Cannula 2.0 


 


11/27/17 23:06  90 22  95 Nasal Cannula 2.0 


 


11/27/17 21:59  102      


 


11/27/17 21:46  92 18 174/98 97 Room Air  








Lab Results (24Hrs)





Laboratory Tests (24 Hours)








Test


  11/27/17


18:51 11/28/17


05:19


 


Total Creatine Kinase


  43 U/L


() 


 


 


White Blood Count


  


  6.65 K/uL


(4.8-10.8)


 


Red Blood Count


  


  4.45 M/uL


(4.7-6.1)  L


 


Hemoglobin


  


  13.7 g/dL


(14.0-18.0)  L


 


Hematocrit


  


  41.2 % (42-52)


L


 


Mean Corpuscular Volume


  


  92.6 fL


()


 


Mean Corpuscular Hemoglobin


  


  30.8 pg


(25-34)


 


Mean Corpuscular Hemoglobin


Concent 


  33.3 g/dl


(32-36)


 


Platelet Count


  


  139 K/uL


(130-400)


 


Mean Platelet Volume


  


  10.4 fL


(7.4-10.4)


 


Neutrophils (%) (Auto)  89.1 %  


 


Lymphocytes (%) (Auto)  9.5 %  


 


Monocytes (%) (Auto)  0.8 %  


 


Eosinophils (%) (Auto)  0.0 %  


 


Basophils (%) (Auto)  0.3 %  


 


Neutrophils # (Auto)


  


  5.93 K/uL


(1.4-6.5)


 


Lymphocytes # (Auto)


  


  0.63 K/uL


(1.2-3.4)  L


 


Monocytes # (Auto)


  


  0.05 K/uL


(0.11-0.59)  L


 


Eosinophils # (Auto)


  


  0.00 K/uL


(0-0.5)


 


Basophils # (Auto)


  


  0.02 K/uL


(0-0.2)








Micro Results











 Date/Time


Source Procedure


Growth Status


 


 


 11/27/17 21:56


Blood Blood Culture


Pending Received


 


 11/27/17 21:51


Blood Blood Culture


Pending Received











Assessment & Plan


Assessment








* 77 year old male admitted with LLL pneumonia


* CT chest read as increased L lung consolidation w/ bronchial wall thickening, 

concerning for chronic aspiration / aspiration pneumonitis, however a 

consideration for SUSANNE also suggested.  Scatter nodularity bilateral possibly 

secondary to infxn vs inflammation.


* Blood cx's pending; no serology ordered; no procalcitonin ordered; no MRSA 

nasal swab ordered


* Afebrile and no leukocytosis noted


* No clear risk factors for resistant organisms








Plan





Vancomycin IV 


* Loading dose: 2750  mg  (~25 mg/kg) given last evening


* Maintenance dose: 1500 mg IV (13.6 mg/kg) every 18 hours


* Goal trough level for pulmonary infxn : 15 to 20 mcg/mL


* Trough level ordered for 11/30/17 w/ 3rd maintenance dose


* p'kinetic estimates: Vd 0.65L/kg; half-life ~ 15 hours 





Cefepime


* continue 2gm IV Q 12 hrs as this is the max dose recommended for eCrCl 30-60cc

/min; patient's eCrCl falls within this range








Pharmacy will continue to follow and will adjust dose/frequency as necessary.  

Thank you.

## 2017-11-29 VITALS
HEART RATE: 98 BPM | SYSTOLIC BLOOD PRESSURE: 197 MMHG | TEMPERATURE: 97.52 F | OXYGEN SATURATION: 94 % | DIASTOLIC BLOOD PRESSURE: 74 MMHG

## 2017-11-29 VITALS
TEMPERATURE: 97.52 F | OXYGEN SATURATION: 94 % | HEART RATE: 67 BPM | DIASTOLIC BLOOD PRESSURE: 86 MMHG | SYSTOLIC BLOOD PRESSURE: 169 MMHG

## 2017-11-29 VITALS
DIASTOLIC BLOOD PRESSURE: 95 MMHG | SYSTOLIC BLOOD PRESSURE: 182 MMHG | TEMPERATURE: 97.52 F | OXYGEN SATURATION: 96 % | HEART RATE: 89 BPM

## 2017-11-29 VITALS — SYSTOLIC BLOOD PRESSURE: 145 MMHG | DIASTOLIC BLOOD PRESSURE: 91 MMHG

## 2017-11-29 VITALS — HEART RATE: 90 BPM | OXYGEN SATURATION: 96 %

## 2017-11-29 VITALS — HEART RATE: 102 BPM | DIASTOLIC BLOOD PRESSURE: 78 MMHG | SYSTOLIC BLOOD PRESSURE: 156 MMHG

## 2017-11-29 VITALS — HEART RATE: 94 BPM | OXYGEN SATURATION: 96 %

## 2017-11-29 VITALS — SYSTOLIC BLOOD PRESSURE: 159 MMHG | HEART RATE: 75 BPM | DIASTOLIC BLOOD PRESSURE: 74 MMHG

## 2017-11-29 VITALS — OXYGEN SATURATION: 97 % | HEART RATE: 77 BPM

## 2017-11-29 VITALS — DIASTOLIC BLOOD PRESSURE: 75 MMHG | SYSTOLIC BLOOD PRESSURE: 172 MMHG

## 2017-11-29 VITALS
SYSTOLIC BLOOD PRESSURE: 148 MMHG | DIASTOLIC BLOOD PRESSURE: 66 MMHG | HEART RATE: 98 BPM | OXYGEN SATURATION: 95 % | TEMPERATURE: 97.88 F

## 2017-11-29 VITALS — OXYGEN SATURATION: 98 % | HEART RATE: 106 BPM

## 2017-11-29 LAB
ANION GAP SERPL CALC-SCNC: 1 MMOL/L (ref 3–11)
BASOPHILS # BLD: 0 K/UL (ref 0–0.2)
BASOPHILS NFR BLD: 0 %
BUN SERPL-MCNC: 19 MG/DL (ref 7–18)
BUN/CREAT SERPL: 11.8 (ref 10–20)
CALCIUM SERPL-MCNC: 9 MG/DL (ref 8.5–10.1)
CHLORIDE SERPL-SCNC: 105 MMOL/L (ref 98–107)
CO2 SERPL-SCNC: 32 MMOL/L (ref 21–32)
COMPLETE: YES
CREAT CL PREDICTED SERPL C-G-VRATE: 48.7 ML/MIN
CREAT SERPL-MCNC: 1.58 MG/DL (ref 0.6–1.4)
EOSINOPHIL NFR BLD AUTO: 144 K/UL (ref 130–400)
GLUCOSE SERPL-MCNC: 145 MG/DL (ref 70–99)
HCT VFR BLD CALC: 38.4 % (ref 42–52)
IG%: 0.2 %
IMM GRANULOCYTES NFR BLD AUTO: 6.5 %
INR PPP: 2.7 (ref 0.9–1.1)
LYMPHOCYTES # BLD: 0.93 K/UL (ref 1.2–3.4)
MAGNESIUM SERPL-MCNC: 2.1 MG/DL (ref 1.8–2.4)
MCH RBC QN AUTO: 31.1 PG (ref 25–34)
MCHC RBC AUTO-ENTMCNC: 33.3 G/DL (ref 32–36)
MCV RBC AUTO: 93.2 FL (ref 80–100)
MONOCYTES NFR BLD: 3.1 %
NEUTROPHILS # BLD AUTO: 0 %
NEUTROPHILS NFR BLD AUTO: 90.2 %
PARTIAL THROMBOPLASTIN RATIO: 1.5
PMV BLD AUTO: 10.2 FL (ref 7.4–10.4)
POTASSIUM SERPL-SCNC: 4.4 MMOL/L (ref 3.5–5.1)
PROTHROMBIN TIME: 29.7 SECONDS (ref 9–12)
RBC # BLD AUTO: 4.12 M/UL (ref 4.7–6.1)
SODIUM SERPL-SCNC: 138 MMOL/L (ref 136–145)
WBC # BLD AUTO: 14.28 K/UL (ref 4.8–10.8)

## 2017-11-29 RX ADMIN — Medication SCH INTER.UNIT: at 07:38

## 2017-11-29 RX ADMIN — GUAIFENESIN SCH MG: 200 TABLET ORAL at 20:10

## 2017-11-29 RX ADMIN — WARFARIN SCH MG: 2 TABLET ORAL at 15:34

## 2017-11-29 RX ADMIN — METHYLPREDNISOLONE SODIUM SUCCINATE SCH MLS/MIN: 1 INJECTION, POWDER, FOR SOLUTION INTRAMUSCULAR; INTRAVENOUS at 11:36

## 2017-11-29 RX ADMIN — BETHANECHOL CHLORIDE SCH MG: 25 TABLET ORAL at 20:08

## 2017-11-29 RX ADMIN — METHYLPREDNISOLONE SODIUM SUCCINATE SCH MLS/MIN: 1 INJECTION, POWDER, FOR SOLUTION INTRAMUSCULAR; INTRAVENOUS at 05:14

## 2017-11-29 RX ADMIN — GABAPENTIN SCH MG: 600 TABLET, FILM COATED ORAL at 07:38

## 2017-11-29 RX ADMIN — LEVALBUTEROL SCH MG: 1.25 SOLUTION, CONCENTRATE RESPIRATORY (INHALATION) at 07:05

## 2017-11-29 RX ADMIN — METHYLPREDNISOLONE SODIUM SUCCINATE SCH MLS/MIN: 1 INJECTION, POWDER, FOR SOLUTION INTRAMUSCULAR; INTRAVENOUS at 23:30

## 2017-11-29 RX ADMIN — METOPROLOL TARTRATE SCH MG: 100 TABLET, FILM COATED ORAL at 20:10

## 2017-11-29 RX ADMIN — IPRATROPIUM BROMIDE SCH MG: 0.5 SOLUTION RESPIRATORY (INHALATION) at 01:30

## 2017-11-29 RX ADMIN — BUDESONIDE SCH MG: 0.5 SUSPENSION RESPIRATORY (INHALATION) at 07:05

## 2017-11-29 RX ADMIN — LEVALBUTEROL SCH MG: 1.25 SOLUTION, CONCENTRATE RESPIRATORY (INHALATION) at 14:09

## 2017-11-29 RX ADMIN — GABAPENTIN SCH MG: 600 TABLET, FILM COATED ORAL at 13:16

## 2017-11-29 RX ADMIN — IPRATROPIUM BROMIDE SCH MG: 0.5 SOLUTION RESPIRATORY (INHALATION) at 07:05

## 2017-11-29 RX ADMIN — LEVALBUTEROL SCH MG: 1.25 SOLUTION, CONCENTRATE RESPIRATORY (INHALATION) at 01:30

## 2017-11-29 RX ADMIN — BETHANECHOL CHLORIDE SCH MG: 25 TABLET ORAL at 07:38

## 2017-11-29 RX ADMIN — GABAPENTIN SCH MG: 600 TABLET, FILM COATED ORAL at 20:10

## 2017-11-29 RX ADMIN — CEFEPIME SCH MLS/MIN: 2 INJECTION, POWDER, FOR SOLUTION INTRAVENOUS at 11:36

## 2017-11-29 RX ADMIN — LEVALBUTEROL SCH MG: 1.25 SOLUTION, CONCENTRATE RESPIRATORY (INHALATION) at 19:33

## 2017-11-29 RX ADMIN — TRAZODONE HYDROCHLORIDE PRN MG: 50 TABLET ORAL at 21:55

## 2017-11-29 RX ADMIN — IPRATROPIUM BROMIDE SCH MG: 0.5 SOLUTION RESPIRATORY (INHALATION) at 19:33

## 2017-11-29 RX ADMIN — IPRATROPIUM BROMIDE SCH MG: 0.5 SOLUTION RESPIRATORY (INHALATION) at 14:09

## 2017-11-29 RX ADMIN — BIMATOPROST SCH DROPS: 0.1 SOLUTION/ DROPS OPHTHALMIC at 20:08

## 2017-11-29 RX ADMIN — METOPROLOL TARTRATE SCH MG: 100 TABLET, FILM COATED ORAL at 07:38

## 2017-11-29 RX ADMIN — MONTELUKAST SODIUM SCH MG: 10 TABLET, FILM COATED ORAL at 07:38

## 2017-11-29 RX ADMIN — BUDESONIDE SCH MG: 0.5 SUSPENSION RESPIRATORY (INHALATION) at 19:33

## 2017-11-29 RX ADMIN — GUAIFENESIN SCH MG: 200 TABLET ORAL at 07:38

## 2017-11-29 RX ADMIN — LEVOFLOXACIN SCH MLS/HR: 5 INJECTION, SOLUTION INTRAVENOUS at 20:08

## 2017-11-29 NOTE — DIAGNOSTIC IMAGING REPORT
(BARIUM SWALLOW) ESOPHAGUS



CLINICAL HISTORY: DYSPHAGIAdysphagia



COMPARISON STUDY: 99



FLUOROSCOPY TIME: 0.7 minutes.



FINDINGS: Patient initiated swallowing function well. There is evidence for side

aspiration. There is no significant cough reflex.



Remainder the esophagus is normal in course and caliber.



IMPRESSION: Aspiration within thin liquids. No evidence for a significant cough

reflex. 













The above report was generated using voice recognition software.  It may contain

grammatical, syntax or spelling errors.







Electronically signed by:  Dimitri Puentes M.D.

11/29/2017 12:56 PM



Dictated Date/Time:  11/29/2017 12:56 PM

## 2017-11-29 NOTE — HOSPITALIST PROGRESS NOTE
Hospitalist Progress Note


Date of Service


Nov 29, 2017.





Subjective


Pt evaluation today including:  conversation w/ patient, physical exam, chart 

review, lab review, review of studies


Pain:  None


PO Intake:  NPO for barium swallow


The patient was seen and examined this morning. Pt reports feeling well today 

and report breathing is improved. His cough is minimal and producing white-

grayish mucous. He denies any hemoptysis or pink tinged mucous.  He denies any 

shortness of breath with NEFF  and has been ambulating in the room without any 

difficult about his room to the bathroom.  His oxygen tubing doesnt reach into 

the sink, so he has actually taken it off both days to wash up, and feels no 

different. He denies any fever, chills or sweats.  He denies any chest pain, 

chest tightness.  No abdominal pain, n/v/d. Pt reports last BM was sunday and 

is requesting stool softeners and mirilax as he typically uses this at home. 





ROS: Reviewed as per HPI and otherwise negative.





Objective


Vital Signs











  Date Time  Temp Pulse Resp B/P (MAP) Pulse Ox O2 Delivery O2 Flow Rate FiO2


 


11/29/17 12:02 36.4 67 20 169/86 (113) 94 Nasal Cannula 2.0 


 


11/29/17 09:19  75  159/74 (102)    


 


11/29/17 08:00      Nasal Cannula 2.0 


 


11/29/17 07:28 36.4 98 18 197/74 (115) 94 Nasal Cannula 2.0 


 


11/29/17 07:05  90 14  96 Nasal Cannula 2.0 


 


11/29/17 04:00  102  156/78 (104)    


 


11/29/17 01:30  94 14  96 Nasal Cannula 2.0 


 


11/29/17 00:00      Nasal Cannula 2.0 


 


11/28/17 23:54 36.4 106 22 158/89 (112) 96  2.0 


 


11/28/17 19:26 36.6 94 20 169/90 (116) 97   


 


11/28/17 19:14  104 14  96 Nasal Cannula 2.0 


 


11/28/17 16:00      Nasal Cannula 2.0 


 


11/28/17 14:54 36.6 106 20 173/90 (117) 96 Nasal Cannula 2.0 


 


11/28/17 14:29  100 14  96 Nasal Cannula 2.0 


 


11/28/17 13:30  96  170/87 (114)    











Physical Exam


Notes:


General Appearance:  WD/WN, no apparent distress, + obese


Eyes:  PERRL, EOMI, no icterus


ENT:  hearing grossly normal, pharynx normal, MMM


Neck:  supple, no JVD


Respiratory/Chest:  chest non-tender, no accessory muscle use, + pertinent 

finding (on 2 L during exam, +inspiratory and expiratory wheeze prominent in 

the left lobes.  No crackles or rales.)


Cardiovascular:  regular rate, rhythm, no murmur


Abdomen:  normal bowel sounds, non tender, soft, + slightly distended


Extremities:  non-tender, no pedal edema, + chronic venous stasis changes


Neurologic/Psychiatric:  alert, oriented x 3, mood normal


Skin:  normal color, warm/dry





Laboratory Results





Last 24 Hours








Test


  11/29/17


05:23


 


White Blood Count 14.28 K/uL 


 


Red Blood Count 4.12 M/uL 


 


Hemoglobin 12.8 g/dL 


 


Hematocrit 38.4 % 


 


Mean Corpuscular Volume 93.2 fL 


 


Mean Corpuscular Hemoglobin 31.1 pg 


 


Mean Corpuscular Hemoglobin


Concent 33.3 g/dl 


 


 


Platelet Count 144 K/uL 


 


Mean Platelet Volume 10.2 fL 


 


Neutrophils (%) (Auto) 90.2 % 


 


Lymphocytes (%) (Auto) 6.5 % 


 


Monocytes (%) (Auto) 3.1 % 


 


Eosinophils (%) (Auto) 0.0 % 


 


Basophils (%) (Auto) 0.0 % 


 


Neutrophils # (Auto) 12.88 K/uL 


 


Lymphocytes # (Auto) 0.93 K/uL 


 


Monocytes # (Auto) 0.44 K/uL 


 


Eosinophils # (Auto) 0.00 K/uL 


 


Basophils # (Auto) 0.00 K/uL 


 


RDW Standard Deviation 50.9 fL 


 


RDW Coefficient of Variation 15.0 % 


 


Immature Granulocyte % (Auto) 0.2 % 


 


Immature Granulocyte # (Auto) 0.03 K/uL 


 


Prothrombin Time 29.7 SECONDS 


 


Prothromb Time International


Ratio 2.7 


 


 


Activated Partial


Thromboplast Time 39.0 SECONDS 


 


 


Partial Thromboplastin Ratio 1.5 


 


Sodium Level 138 mmol/L 


 


Potassium Level 4.4 mmol/L 


 


Chloride Level 105 mmol/L 


 


Carbon Dioxide Level 32 mmol/L 


 


Anion Gap 1.0 mmol/L 


 


Blood Urea Nitrogen 19 mg/dl 


 


Creatinine 1.58 mg/dl 


 


Est Creatinine Clear Calc


Drug Dose 48.7 ml/min 


 


 


Estimated GFR (


American) 47.5 


 


 


Estimated GFR (Non-


American 41.0 


 


 


BUN/Creatinine Ratio 11.8 


 


Random Glucose 145 mg/dl 


 


Calcium Level 9.0 mg/dl 


 


Magnesium Level 2.1 mg/dl 











Assessment and Plan


This is a77 yo M with PMHx of 





Lingular and left lower lobe pneumonia/bilateral nodules, mediastinal 

lymphadenopathy, hemoptysis


Chronic respiratory failure exacerbated in the setting of pneumonia, possible 

aspiration pneumonia


COPD


- Continue cefepime IV and levofloxacin IV- will stop vancomycin. 


- Guaifenesin extended release 600 mg PO BID - collect sputum culture - in 

process


- Xopenex with Atrovent nebulizer Q6H while awake and Q2H prn


- Solu-Medrol 30 mg IV Q12 H today and transition to oral prednisone tomorrow 


- Nasal cannula 3 L oxygen titrated to keep pulse ox greater than or equal to 92

%. - Wears 2 L at baseline. 


- Hold Dulera.


- Continue montelukast


- Pulmonology consulted - follows with Dr. Abraham.


   - Ordered a barium swallow study for possibly dysphagia today at 12:30p.  

Will await results. 


   - Possible needs for bronchoscopy - pt does NOT want this study, he would 

prefer to follow up as an outpatient in 3 weeks with Dr. Abraham and have his 

routinely scheduled PFTs.  Pulm agrees and does not see need for emergent 

bronch. 





Atrial fibrillation/hypertension/chronic anticoagulation


- Continue metoprolol tartrate 100 mg PO BID


- INR therapeutic at 2.6


- Warfarin 2 mg by mouth daily, follow PT/INR.





HTN


- BPin 150s today after morning medications 


- Continue metoprolol as above, consider IV hydralazine or lopressor if is not 

better controlled throughout the day.





Peripheral neuropathy


- Continue gabapentin 600 mg by mouth 3 times a day.





Glaucoma


- Continue Lumigan





Insomnia


- Continue trazodone when necessary





DVT ppx: coumadin





CODE STATUS: FULL CODE





Disposition: From home, possible discharge in 1 day pending barium swallow

## 2017-11-30 VITALS
HEART RATE: 78 BPM | SYSTOLIC BLOOD PRESSURE: 164 MMHG | TEMPERATURE: 97.7 F | OXYGEN SATURATION: 93 % | DIASTOLIC BLOOD PRESSURE: 82 MMHG

## 2017-11-30 VITALS
SYSTOLIC BLOOD PRESSURE: 164 MMHG | DIASTOLIC BLOOD PRESSURE: 82 MMHG | TEMPERATURE: 97.7 F | OXYGEN SATURATION: 93 % | HEART RATE: 78 BPM

## 2017-11-30 VITALS — OXYGEN SATURATION: 98 % | HEART RATE: 72 BPM

## 2017-11-30 LAB
ANION GAP SERPL CALC-SCNC: 7 MMOL/L (ref 3–11)
BASOPHILS # BLD: 0.01 K/UL (ref 0–0.2)
BASOPHILS NFR BLD: 0.1 %
BUN SERPL-MCNC: 21 MG/DL (ref 7–18)
BUN/CREAT SERPL: 14.3 (ref 10–20)
CALCIUM SERPL-MCNC: 9.2 MG/DL (ref 8.5–10.1)
CHLORIDE SERPL-SCNC: 102 MMOL/L (ref 98–107)
CO2 SERPL-SCNC: 30 MMOL/L (ref 21–32)
COMPLETE: YES
CREAT CL PREDICTED SERPL C-G-VRATE: 52 ML/MIN
CREAT SERPL-MCNC: 1.48 MG/DL (ref 0.6–1.4)
EOSINOPHIL NFR BLD AUTO: 146 K/UL (ref 130–400)
GLUCOSE SERPL-MCNC: 134 MG/DL (ref 70–99)
HCT VFR BLD CALC: 39.1 % (ref 42–52)
IG%: 0.5 %
IMM GRANULOCYTES NFR BLD AUTO: 7.1 %
INR PPP: 2.8 (ref 0.9–1.1)
LYMPHOCYTES # BLD: 0.88 K/UL (ref 1.2–3.4)
MAGNESIUM SERPL-MCNC: 2.2 MG/DL (ref 1.8–2.4)
MCH RBC QN AUTO: 31.1 PG (ref 25–34)
MCHC RBC AUTO-ENTMCNC: 33.2 G/DL (ref 32–36)
MCV RBC AUTO: 93.5 FL (ref 80–100)
MONOCYTES NFR BLD: 2.8 %
NEUTROPHILS # BLD AUTO: 0 %
NEUTROPHILS NFR BLD AUTO: 89.5 %
PARTIAL THROMBOPLASTIN RATIO: 1.4
PMV BLD AUTO: 9.9 FL (ref 7.4–10.4)
POTASSIUM SERPL-SCNC: 4.3 MMOL/L (ref 3.5–5.1)
PROTHROMBIN TIME: 31.4 SECONDS (ref 9–12)
RBC # BLD AUTO: 4.18 M/UL (ref 4.7–6.1)
SODIUM SERPL-SCNC: 140 MMOL/L (ref 136–145)
WBC # BLD AUTO: 12.36 K/UL (ref 4.8–10.8)

## 2017-11-30 RX ADMIN — Medication SCH INTER.UNIT: at 09:42

## 2017-11-30 RX ADMIN — IPRATROPIUM BROMIDE SCH MG: 0.5 SOLUTION RESPIRATORY (INHALATION) at 07:28

## 2017-11-30 RX ADMIN — MONTELUKAST SODIUM SCH MG: 10 TABLET, FILM COATED ORAL at 09:42

## 2017-11-30 RX ADMIN — LEVALBUTEROL SCH MG: 1.25 SOLUTION, CONCENTRATE RESPIRATORY (INHALATION) at 07:28

## 2017-11-30 RX ADMIN — GABAPENTIN SCH MG: 600 TABLET, FILM COATED ORAL at 09:42

## 2017-11-30 RX ADMIN — LEVALBUTEROL SCH MG: 1.25 SOLUTION, CONCENTRATE RESPIRATORY (INHALATION) at 14:37

## 2017-11-30 RX ADMIN — METOPROLOL TARTRATE SCH MG: 100 TABLET, FILM COATED ORAL at 10:17

## 2017-11-30 RX ADMIN — METHYLPREDNISOLONE SODIUM SUCCINATE SCH MLS/MIN: 1 INJECTION, POWDER, FOR SOLUTION INTRAMUSCULAR; INTRAVENOUS at 14:31

## 2017-11-30 RX ADMIN — GUAIFENESIN SCH MG: 200 TABLET ORAL at 09:42

## 2017-11-30 RX ADMIN — LEVALBUTEROL SCH MG: 1.25 SOLUTION, CONCENTRATE RESPIRATORY (INHALATION) at 03:00

## 2017-11-30 RX ADMIN — BUDESONIDE SCH MG: 0.5 SUSPENSION RESPIRATORY (INHALATION) at 07:28

## 2017-11-30 RX ADMIN — GABAPENTIN SCH MG: 600 TABLET, FILM COATED ORAL at 14:32

## 2017-11-30 RX ADMIN — IPRATROPIUM BROMIDE SCH MG: 0.5 SOLUTION RESPIRATORY (INHALATION) at 14:37

## 2017-11-30 RX ADMIN — BETHANECHOL CHLORIDE SCH MG: 25 TABLET ORAL at 09:43

## 2017-11-30 RX ADMIN — IPRATROPIUM BROMIDE SCH MG: 0.5 SOLUTION RESPIRATORY (INHALATION) at 03:00

## 2017-11-30 NOTE — DISCHARGE INSTRUCTIONS
Discharge Instructions


Date of Service


Nov 30, 2017.





Admission


Reason for Admission:  Bacterial Pneumonia, Unsp. Copd Exacerbatoin





Discharge


Discharge Diagnosis / Problem:  Aspiration Pneumonia, COPD exacerbation





Discharge Goals


Goal(s):  Decrease discomfort, Improve function, Increase independence, Improve 

disease control





Activity Recommendations


Activity Limitations:  resume your previous activity


Lifting Limitations:  no more than 25 pounds, gradually increase as tolerated


Exercise/Sports Limitations:  none, gradually increase as tolerated


May Resume Sexual Activity:  when tolerated


Shower/Bathe:  no limitations


Driving or Machine Use:  no limitations





.





Instructions / Follow-Up


Instructions / Follow-Up


You were admitted to [facility] with aspiration pneumonia and COPD exacerbation.


During your stay here you were treated with antibiotics, steroids and other 

supportive care.


You were evaluated by pulmonology and it was recommended that you follow up 

with PFTs and Dr. Abraham in 3 weeks.


A Barium swallow study was completed and showed aspiration with thin liquids, 

you were evaluated by Speech therapy during your stay.


*** Speech recommendations: 





Medications: 


For pneumonia: Continue taking levaquin 500 mg x 2 more doses every other day 

starting tomorrow, 12/1/17





For COPD exacerbation:


   Take the prednisone taper as follows starting on 12/1


   40mg (4 tabs) once daily x 2 days


   30 mg (3 tabs) once daily x 2 days


   20 mg (2 tabs) once daily x 2 days


   10 mg (1 tab) once daily x 2 days and then STOP.





Continue taking your other medications as instructed.





Appointments:


Follow up with your Primary Care Provider within 1 week. 


Follow up with Pulmonology within 3 weeks as already scheduled





Current Hospital Diet


Patient's current hospital diet: AHA Diet (Heart Healthy)





Discharge Diet


Recommended Diet:  AHA Diet (Heart Healthy)





Pending Studies


Studies pending at discharge:  no





Laboratory Results





Hemoglobin A1c








Test


  11/14/17


10:12 Range/Units


 


 


Estimated Average Glucose 103   mg/dl


 


Hemoglobin A1c 5.2  4.5-5.6  %








Lipid Panel








Test


  11/14/17


10:12 Range/Units


 


 


Triglycerides Level 110  0-150  mg/dl


 


Cholesterol Level 148  0-200  mg/dl


 


HDL Cholesterol 42   mg/dl


 


Cholesterol/HDL Ratio 3.5   


 


LDL Cholesterol, Calculated 84   mg/dl











Medical Emergencies








.


Who to Call and When:





Medical Emergencies:  If at any time you feel your situation is an emergency, 

please call 911 immediately.





.





Non-Emergent Contact


Non-Emergency issues call your:  Primary Care Provider


Call Non-Emergent contact if:  you have a fever, temperature is above 100.5, 

your pain is not controlled, your pain is worsening, your pain is unusual for 

you, your pain is concerning you, you have any medication questions


other concerns with your health.





Call 911 or go directly to the Emergency Department if you experience any of 

the following: Chest pain, chest tightness, shortness of breath, abdominal pain

, lightheadedness, dizziness, gastrointestinal bleeding, or have any other 

concerns regarding your health. 


.





Past History


Medical & Surgical History:  


(1) Bacterial pneumonia


(2) COPD exacerbation


(3) ATRIAL FIBRILLATION


(4) Benign essential hypertension


(5) CHRONIC KIDNEY DISEASE, STAGE III (MODERATE)


.








"Provider Documentation" section prepared by Angie Herrmann.








.





VTE Core Measure


Inpt VTE Proph given/why not?:  Warfarin (Coumadin)

## 2017-11-30 NOTE — DIAGNOSTIC IMAGING REPORT
VIDEO SWALLOW STUDY



CLINICAL HISTORY: Aspiration.



COMPARISON STUDY: Barium esophagram dated 11/29/2017.



Fluoroscopy time: 2.1 minutes.



FINDINGS: Fluoroscopic guidance was provided to the Department of Speech

Pathology in performing a video swallow study. The patient consumed barium

impregnated pudding, cracker with paste, nectar thick liquid, and thin barium

while the swallowing mechanism was observed in real-time. There is pharyngeal

penetration seen with thin barium. No aspiration was clearly identified. No

penetration or aspiration was seen with the remaining sampled textures. A small

metallic foreign body is present in the neck.



IMPRESSION:



1. There is pharyngeal penetration with thin barium. No aspiration was seen

during today's examination.



2. See dedicated speech pathology report for detailed findings and

recommendations.









Dictated:  11/30/2017 2:31 PM

Transcribed:  11/30/2017 2:56 PM

Reed







Electronically signed by:  Lico Blood M.D.

11/30/2017 3:10 PM



Dictated Date/Time:  11/30/2017 2:31 PM

## 2017-11-30 NOTE — PROGRESS NOTE
DATE: 11/30/2017

 

PULMONARY PROGRESS NOTE

 

PROBLEM LIST:  Includes:  Left-sided pneumonia, significant atelectasis and

volume loss in the left lung, mediastinal adenopathy, multiple lung nodules.

 

SUBJECTIVE:  The patient is a 77-year-old male who was admitted for increased

shortness of breath and possible pneumonia.  The patient was treated with

antibiotics in the form of vancomycin, cefepime and Levaquin.  The patient,

when I saw him today, he is feeling much better, steroids have been cut down,

his procalcitonin was unremarkable.  Reports today that he feels like his

breathing is back to his baseline, did have a little bit of cough last night

with some white mucus produced, but otherwise cough is better than normal. 

He has not had any wheezing.  He has not had any chest heaviness or

tightness.  He is not moving around in his room and has not had any

difficulty.  He is using oxygen which he states that he uses at home.  He

feels that he is ready to go home.  Would like to go home today if possible. 

He did have a video swallow yesterday.  The video swallow did show aspiration

with thin liquids.  Speech therapy is now seeing the patient.  Denies any

other concerns or problems at this time.  No chest pain, no palpitations, no

fever or chills that he is aware of.  No GI disturbances, no nausea or

vomiting, no difficulty with his bowels.  No difficulty with his voiding.  No

swelling in his extremities.

 

OBJECTIVE:

GENERAL:  The patient is a 77-year-old male sitting at bedside, in no acute

distress, no respiratory distress.  Able to complete sentences without any

difficulty.  He is alert and oriented x3.  Mood is good.  Affect is good.

VITAL SIGNS:  Temperature 36.5, pulse 78, respirations 20, blood pressure is

164/82, pulse ox 93-98% on 2 liters.

HEENT:  Normocephalic, atraumatic.  Pupils equal, round, reactive to light

and accommodation.  Extraocular movements are intact.  Pink moist gingival

and buccal mucosa.

NECK:  Supple.  No mass.  No adenopathy.  No bruit.

CHEST:  Diminished breath sounds bilaterally.  Chest is showing some coarse

breath sounds on the left.  No significant rhonchi at this time.

CARDIOVASCULAR:  Irregularly irregular.  No murmurs, gallops or rubs

appreciated.

ABDOMEN:  Bowel sounds are present.  Abdomen is soft, nontender.  No

guarding, rigidity or organomegaly.

EXTREMITIES:  Trace edema, no erythema, no tenderness.  No cyanosis or

clubbing noted.

NEUROLOGIC:  Cranial nerves II-XII are intact.  No focal deficit noted.

 

LABORATORY DATA:  Shows a white count of 12,000, H&H 13.0 at 39.1, platelet

count 146,000.  INR 2.8.  BUN 21, creatinine down to 1.48.  Preliminary

sputum culture shows few gram positive cocci, few gram negative bacilli, few

yeast, few gram negative diplococci.

 

IMAGING DATA:  No new imaging aside from the video swallow which was already

discussed.

 

IMPRESSION:  The patient is a 77-year-old male with asthma who given the

hospital-associated pneumonia is doing better.  Also has a history for

partial lobectomy on the left with significant atelectasis with volume loss,

multiple lung nodules and mediastinal adenopathy and aspiration, it appears

the patient is aspirating at this time.

 

PLAN:  For speech to see the patient today for evaluation.  Earlier Dr. Abraham

did discuss the evaluation for the right upper lobe lung nodule that has

recently presented, the patient wishes to go home.  He has a wife who has

some health issues that he helps take care of.  He is concerned about that. 

From a pulmonary standpoint, he seems to be stable at this time.  He did not

seem to be having any difficulties and it appears that he is close to being

back to his normal state.  At this point, I think that the patient can safely

be discharged home from pulmonary perspective.  Would recommend that he

complete an 8-day prednisone taper.  He is to continue his routine

medications, home pulmonary toilet as it is, await speech therapy

recommendations for home.  The patient does have a followup appointment with

Dr. Abraham in approximately 2-3 weeks.  This will be a good timeframe for his

hospital followup.  He is to keep that appointment.

## 2017-12-15 ENCOUNTER — HOSPITAL ENCOUNTER (OUTPATIENT)
Dept: HOSPITAL 45 - C.RAD1850 | Age: 77
Discharge: HOME | End: 2017-12-15
Attending: INTERNAL MEDICINE
Payer: COMMERCIAL

## 2017-12-15 DIAGNOSIS — J90: ICD-10-CM

## 2017-12-15 DIAGNOSIS — J18.9: Primary | ICD-10-CM

## 2017-12-15 NOTE — DIAGNOSTIC IMAGING REPORT
CHEST 2 VIEWS ROUTINE



HISTORY:      Pneumonia. Follow-up.



COMPARISON: Chest 11/27/2017.



FINDINGS: Significantly improved aeration within the left lung. Small left

pleural effusion and a few left basilar airspace opacities persist. Left rib

deformities remain unchanged. No pneumothorax. The right lung is essentially

clear. The heart is top normal in size.



IMPRESSION:



1. Significant improved aeration within the left lung.

2. Small left pleural effusion and a few left basilar densities persist. This

could represent atelectasis or a small amount of residual pneumonia. Additional

one month chest x-ray follow up is recommended to ensure complete resolution.







Electronically signed by:  Juan Olivas M.D.

12/15/2017 3:03 PM



Dictated Date/Time:  12/15/2017 3:02 PM

## 2018-03-09 LAB
BUN SERPL-MCNC: 13 MG/DL (ref 7–18)
CALCIUM SERPL-MCNC: 8.6 MG/DL (ref 8.5–10.1)
CO2 SERPL-SCNC: 32 MMOL/L (ref 21–32)
CREAT SERPL-MCNC: 1.42 MG/DL (ref 0.6–1.4)
EOSINOPHIL NFR BLD AUTO: 129 K/UL (ref 130–400)
GLUCOSE SERPL-MCNC: 116 MG/DL (ref 70–99)
HCT VFR BLD CALC: 35.2 % (ref 42–52)
HGB BLD-MCNC: 11.8 G/DL (ref 14–18)
MCH RBC QN AUTO: 30.9 PG (ref 25–34)
MCHC RBC AUTO-ENTMCNC: 33.5 G/DL (ref 32–36)
MCV RBC AUTO: 92.1 FL (ref 80–100)
PMV BLD AUTO: 10 FL (ref 7.4–10.4)
POTASSIUM SERPL-SCNC: 3.7 MMOL/L (ref 3.5–5.1)
RED CELL DISTRIBUTION WIDTH CV: 15 % (ref 11.5–14.5)
RED CELL DISTRIBUTION WIDTH SD: 50.3 FL (ref 36.4–46.3)
SODIUM SERPL-SCNC: 139 MMOL/L (ref 136–145)
WBC # BLD AUTO: 7.33 K/UL (ref 4.8–10.8)

## 2018-03-09 NOTE — DIAGNOSTIC IMAGING REPORT
TWO VIEW CHEST



CLINICAL HISTORY: Preoperative examination.



FINDINGS: PA and lateral chest radiographs are compared to study dated

12/15/2017 and correlated with chest CT dated 11/27/2017. The PA view is

degraded by patient rotation. The heart is enlarged and there is atherosclerotic

calcification of the thoracic aorta. The pulmonary vasculature is noncongested.

Consolidative changes again seen at the left lung base. The right lung appears

clear. No pleural effusion is identified. There is no pneumothorax. The skeletal

structures are osteopenic. Degenerative change is noted in the thoracic spine.

There are healed right-sided rib fractures



IMPRESSION:



1. Cardiomegaly without radiographic evidence of congestive failure.



2. Consolidative change is again seen at the left lung base.



3. The right lung appears clear.







Electronically signed by:  Lico Blood M.D.

3/9/2018 2:41 PM



Dictated Date/Time:  3/9/2018 2:40 PM

## 2018-03-09 NOTE — PAT MEDICATION INSTRUCTIONS
Service Date


Mar 9, 2018.





Current Home Medication List


Bethanechol Chloride (Bethanechol Chloride), 100 MG PO BID


Bimatoprost (Lumigan), 1 DROP OPB HS


Cholecalciferol (Vitamin D3), 2,000 UNITS PO DAILY


Gabapentin (Gabapentin), 600 MG PO TID


Home O2 Therapy (Oxygen), 2 LITERS NA CONTINOUS


Ipratropium-Albuterol (Combivent Respimat), 2 PUFFS INH QID PRN for SOB/Wheezing


Levalbuterol HCl (Levalbuterol Hydrochlorid), 1 DOSE INH QAM


Melatonin (Melatonin Maximum Strengt), 1 TAB PO HS


Metoprolol Tartrate (Metoprolol Tartrate), 100 MG PO BID


Mometasone Furoate-Formoterol (Dulera 200/5 Mcg), 2 PUFFS INH HS


Montelukast Sod (Montelukast Sodium), 10 MG PO QPM


Tamsulosin Hcl (Flomax), 0.4 MG PO QAM


Trazodone Hcl (Trazodone), 50 MG PO HS


Warfarin Sodium (Warfarin Sodium), 2 MG PO LUNCH


[mucinex], 1 TAB PO PRN





Medication Instructions


For Your Scheduled Surgery 








- Check with surgeon and prescribing physician for instructions: 


Warfarin Sodium (Warfarin Sodium), 2 MG PO LUNCH








- Check with surgeon for instructions: 


Bimatoprost (Lumigan), 1 DROP OPB HS








- Hold the following medications the morning of surgery:


Bethanechol Chloride (Bethanechol Chloride), 100 MG PO BID


Cholecalciferol (Vitamin D3), 2,000 UNITS PO DAILY


Tamsulosin Hcl (Flomax), 0.4 MG PO QAM


[mucinex], 1 TAB PO PRN





- Take the following medications the morning of surgery with a sip of water:


Metoprolol Tartrate (Metoprolol Tartrate), 100 MG PO BID


Ipratropium-Albuterol (Combivent Respimat), 2 PUFFS INH QID PRN for SOB/

Wheezing (if needed)


Levalbuterol HCl (Levalbuterol Hydrochlorid), 1 DOSE INH QAM


Home O2 Therapy (Oxygen), 2 LITERS NA CONTINOUS


Gabapentin (Gabapentin), 600 MG PO TID








- Take the following medications as scheduled the night before surgery:


[mucinex], 1 TAB PO PRN


Trazodone Hcl (Trazodone), 50 MG PO HS


Montelukast Sod (Montelukast Sodium), 10 MG PO QPM


Mometasone Furoate-Formoterol (Dulera 200/5 Mcg), 2 PUFFS INH HS


Metoprolol Tartrate (Metoprolol Tartrate), 100 MG PO BID


Melatonin (Melatonin Maximum Strengt), 1 TAB PO HS


Ipratropium-Albuterol (Combivent Respimat), 2 PUFFS INH QID PRN for SOB/

Wheezing (if needed)


Home O2 Therapy (Oxygen), 2 LITERS NA CONTINOUS


Gabapentin (Gabapentin), 600 MG PO TID


Bethanechol Chloride (Bethanechol Chloride), 100 MG PO BID








If you have any questions please call us at [phone] or [phone] or 

[phone]

## 2018-03-19 ENCOUNTER — HOSPITAL ENCOUNTER (OUTPATIENT)
Dept: HOSPITAL 45 - X.SURG | Age: 78
Discharge: HOME | End: 2018-03-19
Attending: OPHTHALMOLOGY
Payer: COMMERCIAL

## 2018-03-19 VITALS
OXYGEN SATURATION: 99 % | TEMPERATURE: 97.16 F | HEART RATE: 83 BPM | DIASTOLIC BLOOD PRESSURE: 74 MMHG | SYSTOLIC BLOOD PRESSURE: 178 MMHG

## 2018-03-19 VITALS
HEIGHT: 71.5 IN | HEIGHT: 71.5 IN | BODY MASS INDEX: 32.93 KG/M2 | WEIGHT: 240.5 LBS | WEIGHT: 240.5 LBS | BODY MASS INDEX: 32.93 KG/M2

## 2018-03-19 DIAGNOSIS — I48.91: ICD-10-CM

## 2018-03-19 DIAGNOSIS — M19.90: ICD-10-CM

## 2018-03-19 DIAGNOSIS — H25.12: Primary | ICD-10-CM

## 2018-03-19 DIAGNOSIS — G62.9: ICD-10-CM

## 2018-03-19 DIAGNOSIS — J45.909: ICD-10-CM

## 2018-03-19 DIAGNOSIS — I11.9: ICD-10-CM

## 2018-03-19 DIAGNOSIS — Z88.1: ICD-10-CM

## 2018-03-19 DIAGNOSIS — Z99.81: ICD-10-CM

## 2018-03-19 DIAGNOSIS — J44.9: ICD-10-CM

## 2018-03-19 DIAGNOSIS — H40.1130: ICD-10-CM

## 2018-03-19 RX ADMIN — KETOROLAC TROMETHAMINE SCH DROP: 5 SOLUTION OPHTHALMIC at 10:26

## 2018-03-19 RX ADMIN — MOXIFLOXACIN HYDROCHLORIDE SCH DROP: 5 SOLUTION/ DROPS OPHTHALMIC at 10:32

## 2018-03-19 RX ADMIN — KETOROLAC TROMETHAMINE SCH DROP: 5 SOLUTION OPHTHALMIC at 10:20

## 2018-03-19 RX ADMIN — TROPICAMIDE SCH DROP: 10 SOLUTION/ DROPS OPHTHALMIC at 10:18

## 2018-03-19 RX ADMIN — CYCLOPENTOLATE HYDROCHLORIDE SCH DROP: 10 SOLUTION/ DROPS OPHTHALMIC at 10:25

## 2018-03-19 RX ADMIN — TROPICAMIDE SCH DROP: 10 SOLUTION/ DROPS OPHTHALMIC at 10:24

## 2018-03-19 RX ADMIN — CYCLOPENTOLATE HYDROCHLORIDE SCH DROP: 10 SOLUTION/ DROPS OPHTHALMIC at 10:19

## 2018-03-19 RX ADMIN — MOXIFLOXACIN HYDROCHLORIDE SCH DROP: 5 SOLUTION/ DROPS OPHTHALMIC at 10:22

## 2018-03-19 RX ADMIN — PHENYLEPHRINE HYDROCHLORIDE SCH DROP: 25 SOLUTION/ DROPS OPHTHALMIC at 10:17

## 2018-03-19 RX ADMIN — PHENYLEPHRINE HYDROCHLORIDE SCH DROP: 25 SOLUTION/ DROPS OPHTHALMIC at 10:23

## 2018-03-19 NOTE — [facility] OPERATIVE REPORT
Operative Report


Operative Date


Mar 19, 2018.





Pre-Operative Diagnosis





Left eye cataract





Post-Operative Diagnosis





Same as preop





Procedure(s) Performed





Left Cataract Phacoemulsification With Intraocular Lens Implant





Surgeon


Dr. Jaquez





Assistant Surgeon(s)


None





Estimated Blood Loss


0 mL





Findings


cataract left eye





Fluids


see anesthesia record





Specimens





None





Drains


None





Anesthesia Type


MAC





Complication(s)


none





Disposition


no


Recovery Room / PACU





Indications


decreased vision left eye





Description of Procedure


After informed consent was obtained in the holding area the patient was


wheeled back to the operating room where cardiac monitoring leads and oxygen


by nasal cannula was administered by Anesthesia. Gentle IV sedation was


given, and the patient's left eye was prepped and draped in usual sterile


fashion. A wire lid speculum was placed into the left eye and the operating


microscope was swung into position. Using 0.12 forceps and a Supersharp blade


a paracentesis port was made 3 o'clock hours away from the 3 o'clock position


of the patient's left eye. 1% non-preserved Lidocaine was then injected into


the anterior chamber for anesthesia.  Flomax mix was injected into the eye to 

aid with IFIS.  A 2.0 mm keratotome blade was then used


to make a shelved clear corneal incision at the 3 o'clock position of the


left eye. Amvisc was injected into the anterior chamber and a cystotome and


Utrata forceps were used to perform a curvilinear capsulorrhexis. BSS on a


hydrodissection cannula was used to hydrodissect the lens nucleus away from


the capsular bag. The phacoemulsification handpiece was then used in a stop


and chop fashion to remove the lens nucleus. The irrigation and aspiration


handpiece was then used to remove the residual cortical material. Amvisc was


injected into the capsular bag and anterior chamber and a [employer] MX60


20.0 Diopter intraocular lens was injected into the capsular bag.  Irrigation


and aspiration handpiece was used to remove the residual viscoelastic


material. The wounds were hydrated and noted to be watertight.  The wire lid


speculum was removed from the eye.  Vigamox, Brimonidine, and TobraDex


ointment were placed on the eye and it was shielded.  It should be noted that


EndoCoat was used extensively during the case to protect the cornea endothelium.


 


DISPOSITION:  The patient tolerated the procedure well and was wheeled to the


post anesthesia care unit in stable condition.


I attest to the content of the Intraoperative Record and any orders documented 

therein.  Any exceptions are noted below.


I attest to the content of the Intraoperative Record and any orders documented 

therein.  Any exceptions are noted below.

## 2018-03-19 NOTE — ANESTHESIA PROGRESS NT - [facility]
Anesthesia Post Op Note


Date & Time


Mar 19, 2018 at 12:44





Vital Signs


Pain Intensity:  0





Vital Signs Past 12 Hours








  Date Time  Temp Pulse Resp B/P (MAP) Pulse Ox O2 Delivery O2 Flow Rate FiO2


 


3/19/18 12:38 36.2 83 16 178/74 (108) 99 Nasal Cannula 2 


 


3/19/18 12:18 36.2 85 16 169/95 (119) 96 Nasal Cannula 2 


 


3/19/18 10:08 36.0 68 24 163/99 (120) 98 Nasal Cannula 2.5 











Notes


Mental Status:  alert / awake / arousable, participated in evaluation


Pt Amnestic to Procedure:  Yes


Nausea / Vomiting:  adequately controlled


Pain:  adequately controlled


Airway Patency, RR, SpO2:  stable & adequate


BP & HR:  stable & adequate


Hydration State:  stable & adequate


Anesthetic Complications:  no major complications apparent

## 2018-03-19 NOTE — DISCHARGE INSTRUCTIONS-[facility]
Discharge Instructions


Date of Service


Mar 19, 2018.





Visit


Reason for Visit:  Left Cataract





Discharge


Discharge Diagnosis / Problem:  cataract left eye





Discharge Goals


Goal(s):  Improve function





Activity Recommendations


Activity Limitations:  per Instructions/Follow-up section


Lifting Limitations:  no more than 5 pounds





Anesthesia


.





Post Anesthesia Instructions:





If you have had General Anesthesia or IV Sedation:





*  Do not drive today.


*  Resume driving when surgeon permits.


*  Do not make important decisions or sign legal documents today.


*  Call surgeon for:





   1.  Temperature elevations greater than 101 degrees F.


   2.  Uncontrollable pain.


   3.  Excessive bleeding.


   4.  Persistent nausea and vomiting.


   5.  Medication intolerance (nausea, vomiting or rash).





*  For nausea and vomiting use only clear liquids such as: tea, soda, bouillon 

until nausea subsides, then gradually increase diet as tolerated.





*  If you have any concerns or questions, call your surgeon's office.  If 

physician is unavailable and it is an emergency, call 911 or go to the nearest 

emergency room.





.





Instructions / Follow-Up


Instructions / Follow-Up





ACTIVITY RECOMMENDATIONS:





*  Light activities





*  You may walk outside, read, watch television.





*  Mild irritation and blurred vision are common for the first few days, 

redness around the white part of the eye is common.








MEDICATIONS:





Resume previous medications unless instructed otherwise by your surgeon.





Eye drops (today and tomorrow):


   


   Cipro - one drop in operative eye every 2 hours while awake


   Prednisolone 1% - one drop in operative eye every 2 hours while awake


   


SPECIAL CARE INSTRUCTIONS:





*  If any problems or concerns, please call Dr. Jaquez's office at [phone].





*  Keep plastic shield taped over eye to sleep at night.





*  Keep plastic shield taped over eye except to administer eye drops.





*  Keep plastic shield on until office visit the following day.








FOLLOW UP VISIT:





Follow-up with Dr. Jaquez in the [place] office as scheduled.





If not already scheduled, please call the office at [phone].





Diet Recommendations


Home Diet:  resume previous diet





Procedures


Procedures Performed:  


Left Cataract Phacoemulsification With Intraocular Lens Implant





Pending Studies


Studies pending at discharge:  no





Medical Emergencies








.


Who to Call and When:





Medical Emergencies:  If at any time you feel your situation is an emergency, 

please call 911 immediately.





.





Non-Emergent Contact


Non-Emergency issues call your:  Ophthalmologist





.


.








"Provider Documentation" section prepared by Oliverio Jaquez.








.

## 2018-05-12 ENCOUNTER — HOSPITAL ENCOUNTER (INPATIENT)
Dept: HOSPITAL 45 - C.EDB | Age: 78
LOS: 3 days | Discharge: HOME HEALTH | DRG: 193 | End: 2018-05-15
Attending: HOSPITALIST | Admitting: HOSPITALIST
Payer: COMMERCIAL

## 2018-05-12 VITALS
BODY MASS INDEX: 46.92 KG/M2 | BODY MASS INDEX: 46.92 KG/M2 | WEIGHT: 238.98 LBS | HEIGHT: 60 IN | HEIGHT: 60 IN | WEIGHT: 238.98 LBS

## 2018-05-12 DIAGNOSIS — I25.10: ICD-10-CM

## 2018-05-12 DIAGNOSIS — N18.3: ICD-10-CM

## 2018-05-12 DIAGNOSIS — R63.4: ICD-10-CM

## 2018-05-12 DIAGNOSIS — Z88.1: ICD-10-CM

## 2018-05-12 DIAGNOSIS — N40.0: ICD-10-CM

## 2018-05-12 DIAGNOSIS — Z90.2: ICD-10-CM

## 2018-05-12 DIAGNOSIS — E87.6: ICD-10-CM

## 2018-05-12 DIAGNOSIS — Z79.51: ICD-10-CM

## 2018-05-12 DIAGNOSIS — R93.1: ICD-10-CM

## 2018-05-12 DIAGNOSIS — R91.8: ICD-10-CM

## 2018-05-12 DIAGNOSIS — Z66: ICD-10-CM

## 2018-05-12 DIAGNOSIS — Z79.01: ICD-10-CM

## 2018-05-12 DIAGNOSIS — Z79.899: ICD-10-CM

## 2018-05-12 DIAGNOSIS — I50.33: ICD-10-CM

## 2018-05-12 DIAGNOSIS — E66.01: ICD-10-CM

## 2018-05-12 DIAGNOSIS — I13.0: ICD-10-CM

## 2018-05-12 DIAGNOSIS — R13.10: ICD-10-CM

## 2018-05-12 DIAGNOSIS — I48.91: ICD-10-CM

## 2018-05-12 DIAGNOSIS — J18.9: Primary | ICD-10-CM

## 2018-05-12 DIAGNOSIS — J96.11: ICD-10-CM

## 2018-05-12 DIAGNOSIS — J44.0: ICD-10-CM

## 2018-05-12 DIAGNOSIS — Z99.81: ICD-10-CM

## 2018-05-12 LAB
BASOPHILS # BLD: 0.04 K/UL (ref 0–0.2)
BASOPHILS NFR BLD: 0.3 %
BUN SERPL-MCNC: 11 MG/DL (ref 7–18)
CALCIUM SERPL-MCNC: 8.2 MG/DL (ref 8.5–10.1)
CK MB SERPL-MCNC: 1.6 NG/ML (ref 0.5–3.6)
CO2 SERPL-SCNC: 28 MMOL/L (ref 21–32)
CREAT SERPL-MCNC: 1.69 MG/DL (ref 0.6–1.4)
EOS ABS #: 0.02 K/UL (ref 0–0.5)
EOSINOPHIL NFR BLD AUTO: 125 K/UL (ref 130–400)
GLUCOSE SERPL-MCNC: 178 MG/DL (ref 70–99)
HCT VFR BLD CALC: 34 % (ref 42–52)
HGB BLD-MCNC: 11.4 G/DL (ref 14–18)
IG#: 0.04 K/UL (ref 0–0.02)
IMM GRANULOCYTES NFR BLD AUTO: 8.8 %
INR PPP: 3.1 (ref 0.9–1.1)
LYMPHOCYTES # BLD: 1.09 K/UL (ref 1.2–3.4)
MCH RBC QN AUTO: 30.5 PG (ref 25–34)
MCHC RBC AUTO-ENTMCNC: 33.5 G/DL (ref 32–36)
MCV RBC AUTO: 90.9 FL (ref 80–100)
MONO ABS #: 0.7 K/UL (ref 0.11–0.59)
MONOCYTES NFR BLD: 5.6 %
NEUT ABS #: 10.53 K/UL (ref 1.4–6.5)
NEUTROPHILS # BLD AUTO: 0.2 %
NEUTROPHILS NFR BLD AUTO: 84.8 %
PMV BLD AUTO: 10.3 FL (ref 7.4–10.4)
POTASSIUM SERPL-SCNC: 3.8 MMOL/L (ref 3.5–5.1)
PTT PATIENT: 37.1 SECONDS (ref 21–31)
RED CELL DISTRIBUTION WIDTH CV: 15.1 % (ref 11.5–14.5)
RED CELL DISTRIBUTION WIDTH SD: 50 FL (ref 36.4–46.3)
SODIUM SERPL-SCNC: 140 MMOL/L (ref 136–145)
WBC # BLD AUTO: 12.42 K/UL (ref 4.8–10.8)

## 2018-05-12 NOTE — DIAGNOSTIC IMAGING REPORT
CHEST ONE VIEW PORTABLE



HISTORY:  77 years-old Male Chest Pain acute atypical chest pain



COMPARISON: Chest radiograph 3/9/2018



TECHNIQUE: Portable AP view of the chest



FINDINGS: 

Cardiac silhouette is again enlarged. Atherosclerosis of the aorta. No

pneumothorax. Mild pulmonary vascular congestion. Small left pleural effusion

with persistent left basilar consolidation. Bones of the chest appear grossly

intact.



IMPRESSION: 

1. Cardiomegaly with mild pulmonary vascular congestion.

2. Small left pleural effusion with left basilar consolidation is unchanged. 







The above report was generated using voice recognition software. It may contain

grammatical, syntax or spelling errors.







Electronically signed by:  Maycol Stone M.D.

5/12/2018 9:44 PM



Dictated Date/Time:  5/12/2018 9:42 PM

## 2018-05-13 VITALS — OXYGEN SATURATION: 98 % | HEART RATE: 60 BPM

## 2018-05-13 VITALS
OXYGEN SATURATION: 97 % | DIASTOLIC BLOOD PRESSURE: 90 MMHG | SYSTOLIC BLOOD PRESSURE: 164 MMHG | TEMPERATURE: 97.88 F | HEART RATE: 90 BPM

## 2018-05-13 VITALS
HEART RATE: 97 BPM | DIASTOLIC BLOOD PRESSURE: 92 MMHG | SYSTOLIC BLOOD PRESSURE: 177 MMHG | OXYGEN SATURATION: 96 % | TEMPERATURE: 98.06 F

## 2018-05-13 VITALS — HEART RATE: 82 BPM | TEMPERATURE: 97.88 F | SYSTOLIC BLOOD PRESSURE: 189 MMHG | DIASTOLIC BLOOD PRESSURE: 105 MMHG

## 2018-05-13 VITALS
DIASTOLIC BLOOD PRESSURE: 67 MMHG | HEART RATE: 101 BPM | OXYGEN SATURATION: 92 % | TEMPERATURE: 97.7 F | SYSTOLIC BLOOD PRESSURE: 120 MMHG

## 2018-05-13 VITALS — OXYGEN SATURATION: 98 % | HEART RATE: 58 BPM

## 2018-05-13 VITALS — HEART RATE: 85 BPM | OXYGEN SATURATION: 98 %

## 2018-05-13 VITALS
OXYGEN SATURATION: 92 % | DIASTOLIC BLOOD PRESSURE: 83 MMHG | SYSTOLIC BLOOD PRESSURE: 180 MMHG | TEMPERATURE: 98.24 F | HEART RATE: 92 BPM

## 2018-05-13 VITALS — OXYGEN SATURATION: 92 % | HEART RATE: 98 BPM

## 2018-05-13 VITALS
DIASTOLIC BLOOD PRESSURE: 89 MMHG | HEART RATE: 82 BPM | SYSTOLIC BLOOD PRESSURE: 149 MMHG | TEMPERATURE: 97.88 F | OXYGEN SATURATION: 97 %

## 2018-05-13 VITALS — OXYGEN SATURATION: 92 %

## 2018-05-13 LAB
BUN SERPL-MCNC: 11 MG/DL (ref 7–18)
CALCIUM SERPL-MCNC: 8.4 MG/DL (ref 8.5–10.1)
CO2 SERPL-SCNC: 35 MMOL/L (ref 21–32)
CREAT SERPL-MCNC: 1.57 MG/DL (ref 0.6–1.4)
EOSINOPHIL NFR BLD AUTO: 104 K/UL (ref 130–400)
GLUCOSE SERPL-MCNC: 133 MG/DL (ref 70–99)
HCT VFR BLD CALC: 34.2 % (ref 42–52)
HGB BLD-MCNC: 11.3 G/DL (ref 14–18)
INR PPP: 2.5 (ref 0.9–1.1)
MCH RBC QN AUTO: 30.1 PG (ref 25–34)
MCHC RBC AUTO-ENTMCNC: 33 G/DL (ref 32–36)
MCV RBC AUTO: 91.2 FL (ref 80–100)
PMV BLD AUTO: 9.1 FL (ref 7.4–10.4)
POTASSIUM SERPL-SCNC: 3.4 MMOL/L (ref 3.5–5.1)
RED CELL DISTRIBUTION WIDTH CV: 15.1 % (ref 11.5–14.5)
RED CELL DISTRIBUTION WIDTH SD: 50.4 FL (ref 36.4–46.3)
SODIUM SERPL-SCNC: 140 MMOL/L (ref 136–145)
WBC # BLD AUTO: 8.61 K/UL (ref 4.8–10.8)

## 2018-05-13 RX ADMIN — ALUMINUM ZIRCONIUM TRICHLOROHYDREX GLY SCH EA: 0.2 STICK TOPICAL at 07:46

## 2018-05-13 RX ADMIN — TAMSULOSIN HYDROCHLORIDE SCH MG: 0.4 CAPSULE ORAL at 08:20

## 2018-05-13 RX ADMIN — BETHANECHOL CHLORIDE SCH MG: 25 TABLET ORAL at 21:21

## 2018-05-13 RX ADMIN — METOPROLOL TARTRATE SCH MG: 100 TABLET, FILM COATED ORAL at 21:19

## 2018-05-13 RX ADMIN — GABAPENTIN SCH MG: 600 TABLET, FILM COATED ORAL at 21:21

## 2018-05-13 RX ADMIN — METOPROLOL TARTRATE SCH MG: 100 TABLET, FILM COATED ORAL at 05:33

## 2018-05-13 RX ADMIN — LABETALOL HYDROCHLORIDE PRN MG: 5 INJECTION, SOLUTION INTRAVENOUS at 12:24

## 2018-05-13 RX ADMIN — GABAPENTIN SCH MG: 600 TABLET, FILM COATED ORAL at 14:37

## 2018-05-13 RX ADMIN — GUAIFENESIN SCH MG: 600 TABLET, EXTENDED RELEASE ORAL at 21:20

## 2018-05-13 RX ADMIN — CEFTRIAXONE SODIUM SCH MLS/HR: 1 INJECTION, POWDER, FOR SOLUTION INTRAVENOUS at 05:32

## 2018-05-13 RX ADMIN — GABAPENTIN SCH MG: 600 TABLET, FILM COATED ORAL at 08:20

## 2018-05-13 RX ADMIN — IPRATROPIUM BROMIDE AND ALBUTEROL SULFATE SCH ML: .5; 3 SOLUTION RESPIRATORY (INHALATION) at 23:10

## 2018-05-13 RX ADMIN — ALUMINUM ZIRCONIUM TRICHLOROHYDREX GLY SCH EA: 0.2 STICK TOPICAL at 14:57

## 2018-05-13 RX ADMIN — AZITHROMYCIN MONOHYDRATE SCH MLS/HR: 500 INJECTION, POWDER, LYOPHILIZED, FOR SOLUTION INTRAVENOUS at 04:28

## 2018-05-13 RX ADMIN — IPRATROPIUM BROMIDE AND ALBUTEROL SULFATE SCH ML: .5; 3 SOLUTION RESPIRATORY (INHALATION) at 03:01

## 2018-05-13 RX ADMIN — ALUMINUM ZIRCONIUM TRICHLOROHYDREX GLY SCH EA: 0.2 STICK TOPICAL at 07:45

## 2018-05-13 RX ADMIN — IPRATROPIUM BROMIDE AND ALBUTEROL SULFATE SCH ML: .5; 3 SOLUTION RESPIRATORY (INHALATION) at 15:01

## 2018-05-13 RX ADMIN — IPRATROPIUM BROMIDE AND ALBUTEROL SULFATE SCH ML: .5; 3 SOLUTION RESPIRATORY (INHALATION) at 07:00

## 2018-05-13 RX ADMIN — IPRATROPIUM BROMIDE AND ALBUTEROL SULFATE SCH ML: .5; 3 SOLUTION RESPIRATORY (INHALATION) at 11:00

## 2018-05-13 RX ADMIN — IPRATROPIUM BROMIDE AND ALBUTEROL SULFATE SCH ML: .5; 3 SOLUTION RESPIRATORY (INHALATION) at 19:09

## 2018-05-13 RX ADMIN — DOCUSATE SODIUM SCH MG: 100 CAPSULE, LIQUID FILLED ORAL at 21:19

## 2018-05-13 RX ADMIN — BETHANECHOL CHLORIDE SCH MG: 25 TABLET ORAL at 08:21

## 2018-05-13 RX ADMIN — FLUTICASONE PROPIONATE AND SALMETEROL SCH PUFF: 50; 250 POWDER RESPIRATORY (INHALATION) at 21:17

## 2018-05-13 NOTE — DIAGNOSTIC IMAGING REPORT
CHEST 2 VIEWS ROUTINE



HISTORY:      shortness of breath



COMPARISON: Chest 5/12/2018.



FINDINGS: The heart remains mildly enlarged. No pneumothorax. Small bilateral

pleural effusions have slightly progressed. Chronic left basilar opacities are

again noted. Patchy densities within the right upper lobe remain unchanged. Mild

interstitial and vascular thickening also persists.



IMPRESSION:



1. No change in the interstitial thickening and small bilateral pleural

effusions. This favors mild pulmonary edema.

2. There are also patchy airspace opacities within the right upper lobe which

may represent a superimposed pneumonia or a component of the pulmonary edema.

Recommend continued follow-up to ensure resolution.

3. Stable chronic left basilar airspace opacity.







Electronically signed by:  Juan Olivas M.D.

5/13/2018 9:47 AM



Dictated Date/Time:  5/13/2018 9:45 AM

## 2018-05-13 NOTE — PROGRESS NOTE
Subjective


Date of Service:


May 13, 2018.


Subjective


Pt evaluation today including:  conversation w/ patient, physical exam, chart 

review, lab review, review of studies (cxr, echo), review of inpatient 

medication list


Pain:  denies


PO Intake:  normal eating today


Voiding:  no voiding problems


pt c/o dysphagia for solids; present for weeks, perhaps months


no dysphagia for liquids


has trouble w/ pills, bread, etc 


has had hoarse voice for a few days


asks if his trouble swallowing "has anything to do with my breathing" 





overall feels much better than yesterday 





outpatient office records reviewed - saw Dr. Abraham in April (first week of April

); patient was asked to take his dulara TWICE A DAY rather than daily; there 

had been some discussion about bronch because of a chronic LLL infiltrate; this 

region was where he had a thoracotomy years ago for a benign lesion





Problem List


Medical Problems:


(1) Acute respiratory failure with hypoxia


Status: Acute  





(2) Atrial fibrillation with RVR


Status: Acute  





(3) Congestive heart failure


Status: Acute  





(4) Heart failure


Status: Acute  





(5) Hypoxia


Status: Acute  





(6) Left lower lobe pneumonia


Status: Acute  





(7) Pneumonia involving left lung


Status: Acute  











Review of Systems


Constitutional:  + weight loss (20-30 pounds since January this year - states 

it is intentional - "I just don't eat" ), No fever, No chills


Respiratory:  + cough, + wheezing, + dyspnea on exertion


Cardiac:  No chest pain


Abdomen:  No pain





Objective


Vital Signs











  Date Time  Temp Pulse Resp B/P (MAP) Pulse Ox O2 Delivery O2 Flow Rate FiO2


 


5/13/18 15:17 36.6 90 22 164/90 (114) 97 Room Air  


 


5/13/18 15:03  60 16  98 Nasal Cannula 2.0 


 


5/13/18 12:00      Nasal Cannula 4.0 


 


5/13/18 12:00 36.7 97 18 177/92 (120) 96   


 


5/13/18 11:02  58 16  98 Room Air  


 


5/13/18 08:00      Nasal Cannula 4.0 


 


5/13/18 07:01  85 16  98 Nasal Cannula 4.0 


 


5/13/18 06:10 36.6 82 19 149/89 (109) 97 Nasal Cannula 4.0 


 


5/13/18 04:00      Nasal Cannula 4.0 


 


5/13/18 03:45 36.6 82 16 189/105  Nasal Cannula 4.0 


 


5/13/18 03:17  88 16 186/105 95   


 


5/13/18 02:23  86 20 172/105 98 Nasal Cannula 4.0 


 


5/13/18 01:12  90 20 150/96 97 Nasal Cannula 4.0 


 


5/12/18 23:37  97 22 177/95 99 Nasal Cannula 4.0 


 


5/12/18 22:26     97 Nasal Cannula 4.0 


 


5/12/18 22:25  89 22 166/90 97 Nasal Cannula 4.0 


 


5/12/18 21:46  106      


 


5/12/18 21:34      Room Air  


 


5/12/18 21:13 36.6 102 22 155/84 95 Nasal Cannula 3.5 











Physical Exam


General Appearance:  no apparent distress


ENT:  pharynx normal


Neck:  no JVD


Respiratory/Chest:  no respiratory distress, no accessory muscle use, + 

decreased breath sounds (left base), + wheezing (left lung, no wheezing on right

), + pertinent finding (no rales in any lobe)


Cardiovascular:  no gallop, no murmur, + irregularly irregular


Abdomen:  normal bowel sounds, non tender, soft, no organomegaly


Extremities:  no pedal edema


Neurologic/Psychiatric:  alert, oriented x 3





Laboratory Results





Last 24 Hours








Test


  5/12/18


21:33 5/12/18


21:40 5/13/18


08:21


 


White Blood Count 12.42 K/uL   8.61 K/uL 


 


Red Blood Count 3.74 M/uL   3.75 M/uL 


 


Hemoglobin 11.4 g/dL   11.3 g/dL 


 


Hematocrit 34.0 %   34.2 % 


 


Mean Corpuscular Volume 90.9 fL   91.2 fL 


 


Mean Corpuscular Hemoglobin 30.5 pg   30.1 pg 


 


Mean Corpuscular Hemoglobin


Concent 33.5 g/dl 


  


  33.0 g/dl 


 


 


Platelet Count 125 K/uL   104 K/uL 


 


Mean Platelet Volume 10.3 fL   9.1 fL 


 


Neutrophils (%) (Auto) 84.8 %   


 


Lymphocytes (%) (Auto) 8.8 %   


 


Monocytes (%) (Auto) 5.6 %   


 


Eosinophils (%) (Auto) 0.2 %   


 


Basophils (%) (Auto) 0.3 %   


 


Neutrophils # (Auto) 10.53 K/uL   


 


Lymphocytes # (Auto) 1.09 K/uL   


 


Monocytes # (Auto) 0.70 K/uL   


 


Eosinophils # (Auto) 0.02 K/uL   


 


Basophils # (Auto) 0.04 K/uL   


 


RDW Standard Deviation 50.0 fL   50.4 fL 


 


RDW Coefficient of Variation 15.1 %   15.1 % 


 


Immature Granulocyte % (Auto) 0.3 %   


 


Immature Granulocyte # (Auto) 0.04 K/uL   


 


Prothrombin Time 31.6 SECONDS   25.3 SECONDS 


 


Prothromb Time International


Ratio 3.1 


  


  2.5 


 


 


Activated Partial


Thromboplast Time 37.1 SECONDS 


  


  


 


 


Partial Thromboplastin Ratio 1.4   


 


Sodium Level 140 mmol/L   140 mmol/L 


 


Potassium Level 3.8 mmol/L   3.4 mmol/L 


 


Chloride Level 106 mmol/L   102 mmol/L 


 


Carbon Dioxide Level 28 mmol/L   35 mmol/L 


 


Anion Gap 6.0 mmol/L   3.0 mmol/L 


 


Blood Urea Nitrogen 11 mg/dl   11 mg/dl 


 


Creatinine 1.69 mg/dl   1.57 mg/dl 


 


Estimated GFR (


American) 43.8 


  


  47.9 


 


 


Estimated GFR (Non-


American 37.8 


  


  41.3 


 


 


BUN/Creatinine Ratio 6.6   6.9 


 


Random Glucose 178 mg/dl   133 mg/dl 


 


Calcium Level 8.2 mg/dl   8.4 mg/dl 


 


Magnesium Level 1.8 mg/dl   1.8 mg/dl 


 


Total Creatine Kinase 83 U/L   


 


Creatine Kinase MB 1.6 ng/ml   


 


Creatine Kinase MB Ratio 1.9   


 


Troponin I < 0.015 ng/ml   


 


Pro-B-Type Natriuretic Peptide 23117 pg/ml   


 


Procalcitonin 0.21 ng/ml   


 


Bedside Lactic Acid Venous  1.30 mmol/L  


 


Est Creatinine Clear Calc


Drug Dose 


  


  39.9 ml/min 


 











Assessment and Plan


76yo male -





1.  RUL pneumonia with ?LLL pneumonia - patient has had persistent infiltrative 

disease of the LLL, however, since 2017.  It is unclear if there is any 

superimposed new pneumonia in the LLL at this time.


Will treat for community-acquired pneumonia with rocephin & zithromax. 


Follow blood cx's.  


Uncertain is aspiration has played a role given his complaints today; will ask 

speech to see.  


He has had a video swallow in the past (11/2017). 





2.  acute/chronic diastolic CHF - improved today.  Will give another dose of IV 

lasix 20mg x 1.  


Reassess in am; recheck BMP am. 


Cont BB.





3.  CKD stage 3 - creatinine today is at baseline. 





4.  uncontrolled HTN - patient reports his BPs are always high.  BP control 

will be essential to control #2.  Will give lisinopril 5mg x 1; as long as 

renal function remains stable then continue low-dose ACE.


If he can't tolerate ACE then CCB would be my next choice. 





5.  a. fib - rates controlled. 


INR now 2.5.


Resume warfarin 2mg daily. 


Daily INR. 





6.  chronic hypoxic respiratory failure on home o2 - 2nd o COPD - cont inhalers

, nebs, etc.  O2 sats are stable.





7.  severe COPD - may be in a very early exacerbation.  Monitor off steroids 

for now. 





8.  weight loss, persistent LLL opacities - strongly consider repeat chest CT 

to r/o malignancy.  





9.  hypokalemia - replace K with oral potassium. 





10.  BPH - alpha blocker. 





11.  wall motion abnormality on echo - will presume he has CAD until otherwise 

proven.  Should be on BB, statin, and aspirin at minimum.  





12.  dysphagia - has had w/u in the past for this in 2017.


Reconsult speech.


Change diet to mech soft in meantime. 





Also obtain PT, OT consultations.


Continued [facility] stay due to:  multiple IV medications needed


Discharge planning:  home

## 2018-05-13 NOTE — ECHOCARDIOGRAM REPORT
*NOTICE TO RECEIVING PARTY AGENCY**  This information is strictly Confidential and protected under 
[place] law.  [place] law prohibits you from making any further disclosure of this 
information unless further disclosure is expressly permitted by the written consent of the person to 
whom it pertains or is authorized by law.  A general authorization for the release of medical or 
other information is not sufficient for this purpose.  Hospital accepts no responsibility if the 
information is made available to any other person, INCLUDING THE PATIENT.



Interpretation Summary

  *  Name: LENNIE BURNETTE  Study Date: 2018 07:18 AM  BP: 149/89 mmHg

  *  MRN: [id number]  Patient Location: C.2T\S\S229\S\1  HR: 82

  *  : 1940 (M/d/yyy)  Gender: Male  Height: 72 in

  *  Age: 77 yrs  Ethnicity: [place]  Weight: 240 lb

  *  Ordering Physician: Janet Munoz

  *  Referring Physician: Self, Referred

  *  Performed By: Mallika Taylor RDCS

  *  Accession# [id number]  Account# [id number]

  *  Reason For Study: CHF

  *  BSA: 2.3 m2

  *  -- Conclusions --

  *  There is mild concentric left ventricular hypertrophy.

  *  Ejection Fraction = 60-65%.

  *  Mild aortic regurgitation.

  *  There is mild to moderate mitral regurgitation.

  *  Right ventricular systolic pressure is normal.

  *  Borderline aortic root dilatation.

  *  Compared to an echocardiogram from 2015, there has been development of some mild aortic and 
mitral regurgitation.

Procedure Details

  *  A complete two-dimensional transthoracic echocardiogram was performed (2D, M-mode, Doppler and 
color flow Doppler).

  *  A contrast injection of Definity was performed to improve assessment of LV function.

  *  Contrast was injected into an intravenous site in the right arm.

  *  One vial of Definity ultrasound contrast was diluted in normal saline to a total volume of 10 
ml.  A total of '1' ml of solution was administered during imaging.

  *  Lot # [id number] of Definity utilized for procedure.

  *  Expiration date 1 .

  *  The attending nurse who injected the contrast agent was Adrian Deluca RN.

Left Ventricle

  *  The left ventricle is normal in size.

  *  There is mild concentric left ventricular hypertrophy.

  *  Ejection Fraction = 60-65%.

  *  Left ventricular systolic function is normal.

  *  There is some mild basal inferior hypokinesis

Right Ventricle

  *  The right ventricle is normal in size and function.

Atria

  *  The left atrial size is normal.

  *  Right atrial size is normal.

Mitral Valve

  *  The mitral valve is grossly normal.

  *  There is no mitral valve stenosis.

  *  There is mild to moderate mitral regurgitation.

Tricuspid Valve

  *  The tricuspid valve is not well visualized, but is grossly normal.

  *  There is trace tricuspid regurgitation.

  *  Right ventricular systolic pressure is normal.

Aortic Valve

  *  The aortic valve is normal in structure and function.

  *  No hemodynamically significant valvular aortic stenosis.

  *  Mild aortic regurgitation.

Pulmonic Valve

  *  The pulmonic valve is not well seen, but is grossly normal.

Great Vessels

  *  Borderline aortic root dilatation.

Pericardium/Pleural

  *  There is no pericardial effusion.



MMode 2D Measurements and Calculations

IVSd 1.3 cm



LVIDd 4.2 cm

LVIDs 2.7 cm

LVPWd 1.4 cm



IVS/LVPW 0.88 

FS 36.2 %

EDV(Teich) 80.5 ml

ESV(Teich) 27.2 ml

EF(Teich) 66.2 %



EDV(cubed) 76.4 ml

ESV(cubed) 19.9 ml

EF(cubed) 74.0 %





LV mass(C)d 215.3 grams

LV mass(C)dI 93.5 grams/m\S\2



SV(Teich) 53.3 ml

SI(Teich) 23.2 ml/m\S\2

SV(cubed) 56.6 ml

SI(cubed) 24.6 ml/m\S\2



Ao root diam 3.9 cm

Ao root area 12.2 cm\S\2

ACS 1.7 cm

LA dimension 3.7 cm



asc Aorta Diam 3.1 cm





LA/Ao 0.95 

LVOT diam 2.0 cm

LVOT area 3.2 cm\S\2



LVAd ap4 30.6 cm\S\2

LVLd ap4 7.6 cm

EDV(MOD-sp4) 100.5 ml

EDV(sp4-el) 104.1 ml

LVAs ap4 17.2 cm\S\2

LVLs ap4 6.4 cm

ESV(MOD-sp4) 39.1 ml

ESV(sp4-el) 39.4 ml

EF(MOD-sp4) 61.1 %

EF(sp4-el) 62.2 %



LVAd ap2 21.5 cm\S\2

LVLd ap2 6.7 cm

EDV(MOD-sp2) 57.4 ml

EDV(sp2-el) 58.7 ml

LVAs ap2 12.7 cm\S\2

LVLs ap2 6.2 cm

ESV(MOD-sp2) 21.6 ml

ESV(sp2-el) 21.9 ml

EF(MOD-sp2) 62.4 %

EF(sp2-el) 62.7 %



LVLd %diff -14.03 %

EDV(MOD-bp) 81.3 ml

LVLs %diff -3.30 %

ESV(MOD-bp) 28.4 ml

EF(MOD-bp) 65.1 %





SV(MOD-sp4) 61.4 ml

SI(MOD-sp4) 26.7 ml/m\S\2



SV(MOD-sp2) 35.8 ml

SI(MOD-sp2) 15.6 ml/m\S\2



SV(MOD-bp) 52.9 ml

SI(MOD-bp) 23.0 ml/m\S\2



SV(sp4-el) 64.7 ml

SI(sp4-el) 28.1 ml/m\S\2





SV(sp2-el) 36.8 ml

SI(sp2-el) 16.0 ml/m\S\2













Doppler Measurements and Calculations

MV E max ovidio 112.8 cm/sec



MV dec time 0.21 sec



Ao V2 max 101.4 cm/sec

Ao max PG 4.1 mmHg

Ao max PG (full) 1.9 mmHg

PEDRO(V,A) 2.4 cm\S\2

PEDRO(V,D) 2.4 cm\S\2



AI max ovidio 416.8 cm/sec

AI max PG 69.5 mmHg

AI dec slope 222.5 cm/sec\S\2

AI P1/2t 548.7 msec





LV V1 max PG 2.3 mmHg



LV V1 max 75.2 cm/sec



MR max ovidio 611.5 cm/sec

MR max .6 mmHg

MR mean ovidio 450.4 cm/sec

MR mean PG 94.2 mmHg

MR .2 cm



PA V2 max 65.6 cm/sec

PA max PG 1.7 mmHg

PA acc slope 424.3 cm/sec\S\2

PA acc time 0.13 sec





TR max ovidio 196.7 cm/sec



PA pr(Accel) 20.7 mmHg

## 2018-05-13 NOTE — HISTORY AND PHYSICAL
History & Physical


Date & Time of Service:


May 13, 2018 at 02:40


Chief Complaint:


Oxygen Level Low


Primary Care Physician:


Roshan Chavez M.D.


History of Present Illness


Patient is a 76yo male with history of COPD on 2L home O2, AF on 

anticoagulation presenting with 1 day of progressive shortness of breath.  

Patient also reports some orthopnea and a cough that was initially dry but had 

an episode of hemoptysis.  Patient reports subjective fevers, chills and sweats 

x 1 day as well as generalized weakness and fatigue.  Family reports oxygen 

saturation in the 70's earlier tonight that resolved with increasing the oxygen 

to 2.5 liters.  No additional complaints. On arrival to the ER he was afebrile, 

slightly tachycardic at 106bpm, hypertensive 166/90, RR of 22 and 97% on 4L





ER:  Lasix 20mg





Past Medical/Surgical History


Medical Problems:





1.  Atrial fibrillation


2. Hypertension


3. CKD - Stage III


4. COPD


5. Asthma





Past Surgical History:


Left lobectomy for possible lung CA - 15 years ago


Hernia repair x 5


Cataract











Family History





Diabetes mellitus


FHx: kidney disease


Heart disease


Hypertension


Kidney stone





Social History


Smoking Status:  Never Smoker


Alcohol Use:  none


Drug Use:  none


Marital Status:  


Housing status:  lives with family


Occupational Status:  retired





Immunizations


History of Influenza Vaccine:  Unknown


Influenza Vaccine Date:  Oct 11, 2011


History of Tetanus Vaccine?:  Unknown


History of Pneumococcal:  Unknown


Pneumococcal Date:  Ej 10, 2008


History of Hepatitis B Vaccine:  Unknown





Allergies


Coded Allergies:  


     Ciprofloxacin (Verified  Adverse Reaction, Unknown, GI UPSET, 2/23/18)





Home Medications


Scheduled


Bethanechol Chloride (Bethanechol Chloride), 50 MG PO BID


Cholecalciferol (Vitamin D3), 2,000 UNITS PO DAILY


Docusate Sodium (Stool Softener), 100 MG PO DAILY


Gabapentin (Gabapentin), 600 MG PO TID


Levalbuterol (Levalbuterol HCl), 1 DOSE INFIL Q8


Loteprednol Etabonate-Tobramyc (Zylet), 1 DROPS OPB QAM


Melatonin (Melatonin Maximum Strengt), 1 TAB PO HS


Metoprolol Tartrate (Metoprolol Tartrate), 100 MG PO BID


Mometasone Furoate-Formoterol (Dulera 200/5 Mcg), 2 PUFFS INH HS


Tamsulosin Hcl (Flomax), 0.4 MG PO DAILY


Warfarin Sodium (Warfarin Sodium), 2 MG PO HELD TODAY





Scheduled PRN


Acetaminophen (Tylenol Arthritis Ext Rel), 1,300 MG PO DAILY PRN for Pain


Guaifenesin Ext Rel (Mucinex Ext Rel), 600 MG PO Q12 PRN for CONGESTION


Ipratropium-Albuterol (Combivent Respimat), 2 PUFFS INH QID PRN for SOB/Wheezing


Trazodone Hcl (Trazodone), 50 MG PO HS PRN for Sleep





Review of Systems


Constitutional:  + fever, + chills, + sweats


Eyes:  No worsening of vision, No diplopia


ENT:  No hearing loss, No sore throat, No trouble swallowing


Respiratory:  + cough, + sputum, + shortness of breath, + dyspnea on exertion, 

+ hemoptysis, No wheezing


Cardiovascular:  No chest pain, No edema, No palpitations


Abdomen:  + pain, + nausea, + vomiting, + diarrhea, + constipation


Musculoskeletal:  + joint pain, + muscle pain


Genitourinary - Male:  + hematuria, + dysuria


Neurologic:  + weakness


Endocrine:  + fatigue


Hematologic / Lymphatic:  No abnormal bleeding/bruising, No clotting problems


Integumentary:  No rash, No itch





Physical Exam


Vital Signs











  Date Time  Temp Pulse Resp B/P (MAP) Pulse Ox O2 Delivery O2 Flow Rate FiO2


 


5/13/18 02:23  86 20 172/105 98 Nasal Cannula 4.0 


 


5/13/18 01:12  90 20 150/96 97 Nasal Cannula 4.0 


 


5/12/18 23:37  97 22 177/95 99 Nasal Cannula 4.0 


 


5/12/18 22:26     97 Nasal Cannula 4.0 


 


5/12/18 22:25  89 22 166/90 97 Nasal Cannula 4.0 


 


5/12/18 21:46  106      


 


5/12/18 21:34      Room Air  


 


5/12/18 21:13 36.6 102 22 155/84 95 Nasal Cannula 3.5 








General Appearance:  WD/WN, no apparent distress


Head:  normocephalic, atraumatic


Eyes:  normal inspection, EOMI, sclerae normal


ENT:  hearing grossly normal, pharynx normal


Neck:  supple, no adenopathy, thyroid normal, no JVD, no carotid bruits, 

trachea midline


Respiratory/Chest:  chest non-tender, no respiratory distress, no accessory 

muscle use, + pertinent finding (+crackles in right lung, no wheezing 

appreciated)


Cardiovascular:  no edema, no gallop, no murmur, + abnormal rhythm (irregularly 

irregular)


Abdomen/GI:  normal bowel sounds, non tender, soft, no organomegaly


Back:  normal inspection, no CVA tenderness


Skin:  normal color, warm/dry, no rash





Diagnostics


Laboratory Results





Results Past 24 Hours








Test


  5/12/18


21:33 5/12/18


21:40 Range/Units


 


 


White Blood Count 12.42  4.8-10.8  K/uL


 


Red Blood Count 3.74  4.7-6.1  M/uL


 


Hemoglobin 11.4  14.0-18.0  g/dL


 


Hematocrit 34.0  42-52  %


 


Mean Corpuscular Volume 90.9    fL


 


Mean Corpuscular Hemoglobin 30.5  25-34  pg


 


Mean Corpuscular Hemoglobin


Concent 33.5


  


  32-36  g/dl


 


 


Platelet Count 125  130-400  K/uL


 


Mean Platelet Volume 10.3  7.4-10.4  fL


 


Neutrophils (%) (Auto) 84.8   %


 


Lymphocytes (%) (Auto) 8.8   %


 


Monocytes (%) (Auto) 5.6   %


 


Eosinophils (%) (Auto) 0.2   %


 


Basophils (%) (Auto) 0.3   %


 


Neutrophils # (Auto) 10.53  1.4-6.5  K/uL


 


Lymphocytes # (Auto) 1.09  1.2-3.4  K/uL


 


Monocytes # (Auto) 0.70  0.11-0.59  K/uL


 


Eosinophils # (Auto) 0.02  0-0.5  K/uL


 


Basophils # (Auto) 0.04  0-0.2  K/uL


 


RDW Standard Deviation 50.0  36.4-46.3  fL


 


RDW Coefficient of Variation 15.1  11.5-14.5  %


 


Immature Granulocyte % (Auto) 0.3   %


 


Immature Granulocyte # (Auto) 0.04  0.00-0.02  K/uL


 


Prothrombin Time


  31.6


  


  9.0-12.0


SECONDS


 


Prothromb Time International


Ratio 3.1


  


  0.9-1.1  


 


 


Activated Partial


Thromboplast Time 37.1


  


  21.0-31.0


SECONDS


 


Partial Thromboplastin Ratio 1.4   


 


Sodium Level 140  136-145  mmol/L


 


Potassium Level 3.8  3.5-5.1  mmol/L


 


Chloride Level 106    mmol/L


 


Carbon Dioxide Level 28  21-32  mmol/L


 


Anion Gap 6.0  3-11  mmol/L


 


Blood Urea Nitrogen 11  7-18  mg/dl


 


Creatinine


  1.69


  


  0.60-1.40


mg/dl


 


Estimated GFR (


American) 43.8


  


   


 


 


Estimated GFR (Non-


American 37.8


  


   


 


 


BUN/Creatinine Ratio 6.6  10-20  


 


Random Glucose 178  70-99  mg/dl


 


Calcium Level 8.2  8.5-10.1  mg/dl


 


Magnesium Level 1.8  1.8-2.4  mg/dl


 


Total Creatine Kinase 83    U/L


 


Creatine Kinase MB 1.6  0.5-3.6  ng/ml


 


Creatine Kinase MB Ratio 1.9  0-3.0  


 


Troponin I < 0.015  0-0.045  ng/ml


 


Pro-B-Type Natriuretic Peptide 95560  0-1800  pg/ml


 


Procalcitonin 0.21  0-0.5  ng/ml


 


Bedside Lactic Acid Venous


  


  1.30


  0.90-1.70


mmol/L








Microbiology Results


5/12/18 Blood Culture, Received


          Pending


5/12/18 Blood Culture, Received


          Pending





Diagnostic Radiology


CHEST ONE VIEW PORTABLE





HISTORY:  77 years-old Male Chest Pain acute atypical chest pain





COMPARISON: Chest radiograph 3/9/2018





TECHNIQUE: Portable AP view of the chest





FINDINGS: 


Cardiac silhouette is again enlarged. Atherosclerosis of the aorta. No


pneumothorax. Mild pulmonary vascular congestion. Small left pleural effusion


with persistent left basilar consolidation. Bones of the chest appear grossly


intact.





IMPRESSION: 


1. Cardiomegaly with mild pulmonary vascular congestion.


2. Small left pleural effusion with left basilar consolidation is unchanged.





Impression


Assessment and Plan


76yo male presenting with 1 day of progressive shortness of breath





1.  SOB - adequate oxygenation onf 2L at present, no respiratory distress.  No 

overt evidence of cardiac volume overload.  Concern for early PNA vs CHF, less 

likely COPD as patient not presently wheezing


-Cefrtriaxone and Azithromycin


-Guaifenasin PRN


-Duonebs q 4 hours PRN


-Combivent


-Check 2D echocardiogram


-Daily weights, I/Os





2.  Afib - rate controlled, anticoagulated on Coumadin with INR=3.1


-Hold Coumadin in setting of hemoptysis


-Continue Metoprolol


-INR in AM





3.  Hypertension - 166/90


-Continue Metoprolol 100mg po BID


-Labetalol PRN





4.  CKD - 


-Continue to monitor


-Renal dosing where appropriate


-Avoid nephrotoxic agents





5.  COPD - 


-Supplemental O2 at home level


-Nebs PRN


-Combivent





6.  F/E/N - Heplock, monitor electrolytes, diet as tolerated





7.  Ppx - Lovenox for DVT





8.  Code - DNR per discussion with patient





9.  Admit to telemetry





Resuscitation Status








VTE Prophylaxis


Will order VTE Prophylaxis:  Yes

## 2018-05-14 VITALS — OXYGEN SATURATION: 96 % | HEART RATE: 84 BPM

## 2018-05-14 VITALS
DIASTOLIC BLOOD PRESSURE: 90 MMHG | HEART RATE: 97 BPM | SYSTOLIC BLOOD PRESSURE: 176 MMHG | TEMPERATURE: 98.42 F | OXYGEN SATURATION: 92 %

## 2018-05-14 VITALS
SYSTOLIC BLOOD PRESSURE: 177 MMHG | OXYGEN SATURATION: 98 % | TEMPERATURE: 98.42 F | DIASTOLIC BLOOD PRESSURE: 95 MMHG | HEART RATE: 96 BPM

## 2018-05-14 VITALS — OXYGEN SATURATION: 92 %

## 2018-05-14 VITALS
SYSTOLIC BLOOD PRESSURE: 155 MMHG | OXYGEN SATURATION: 92 % | DIASTOLIC BLOOD PRESSURE: 88 MMHG | TEMPERATURE: 98.06 F | HEART RATE: 86 BPM

## 2018-05-14 VITALS — OXYGEN SATURATION: 94 % | HEART RATE: 108 BPM

## 2018-05-14 VITALS — HEART RATE: 66 BPM | OXYGEN SATURATION: 95 %

## 2018-05-14 VITALS — HEART RATE: 56 BPM | OXYGEN SATURATION: 96 %

## 2018-05-14 VITALS
SYSTOLIC BLOOD PRESSURE: 190 MMHG | OXYGEN SATURATION: 95 % | DIASTOLIC BLOOD PRESSURE: 73 MMHG | TEMPERATURE: 98.6 F | HEART RATE: 94 BPM

## 2018-05-14 VITALS — HEART RATE: 99 BPM | OXYGEN SATURATION: 98 %

## 2018-05-14 VITALS
TEMPERATURE: 97.34 F | SYSTOLIC BLOOD PRESSURE: 162 MMHG | HEART RATE: 66 BPM | OXYGEN SATURATION: 92 % | DIASTOLIC BLOOD PRESSURE: 85 MMHG

## 2018-05-14 VITALS
DIASTOLIC BLOOD PRESSURE: 84 MMHG | SYSTOLIC BLOOD PRESSURE: 160 MMHG | HEART RATE: 101 BPM | TEMPERATURE: 98.42 F | OXYGEN SATURATION: 97 %

## 2018-05-14 VITALS — DIASTOLIC BLOOD PRESSURE: 81 MMHG | OXYGEN SATURATION: 93 % | HEART RATE: 91 BPM | SYSTOLIC BLOOD PRESSURE: 150 MMHG

## 2018-05-14 VITALS — OXYGEN SATURATION: 94 %

## 2018-05-14 LAB
BASOPHILS # BLD: 0.02 K/UL (ref 0–0.2)
BASOPHILS NFR BLD: 0.3 %
BUN SERPL-MCNC: 13 MG/DL (ref 7–18)
CALCIUM SERPL-MCNC: 8.1 MG/DL (ref 8.5–10.1)
CO2 SERPL-SCNC: 32 MMOL/L (ref 21–32)
CREAT SERPL-MCNC: 1.51 MG/DL (ref 0.6–1.4)
EOS ABS #: 0.19 K/UL (ref 0–0.5)
EOSINOPHIL NFR BLD AUTO: 108 K/UL (ref 130–400)
GLUCOSE SERPL-MCNC: 115 MG/DL (ref 70–99)
HCT VFR BLD CALC: 32.8 % (ref 42–52)
HGB BLD-MCNC: 10.9 G/DL (ref 14–18)
IG#: 0.01 K/UL (ref 0–0.02)
IMM GRANULOCYTES NFR BLD AUTO: 18.8 %
INR PPP: 1.9 (ref 0.9–1.1)
LYMPHOCYTES # BLD: 1.39 K/UL (ref 1.2–3.4)
MCH RBC QN AUTO: 30.2 PG (ref 25–34)
MCHC RBC AUTO-ENTMCNC: 33.2 G/DL (ref 32–36)
MCV RBC AUTO: 90.9 FL (ref 80–100)
MONO ABS #: 0.62 K/UL (ref 0.11–0.59)
MONOCYTES NFR BLD: 8.4 %
NEUT ABS #: 5.15 K/UL (ref 1.4–6.5)
NEUTROPHILS # BLD AUTO: 2.6 %
NEUTROPHILS NFR BLD AUTO: 69.8 %
PMV BLD AUTO: 9.8 FL (ref 7.4–10.4)
POTASSIUM SERPL-SCNC: 3.5 MMOL/L (ref 3.5–5.1)
RED CELL DISTRIBUTION WIDTH CV: 15.1 % (ref 11.5–14.5)
RED CELL DISTRIBUTION WIDTH SD: 49.9 FL (ref 36.4–46.3)
SODIUM SERPL-SCNC: 140 MMOL/L (ref 136–145)
WBC # BLD AUTO: 7.38 K/UL (ref 4.8–10.8)

## 2018-05-14 RX ADMIN — GUAIFENESIN SCH MG: 600 TABLET, EXTENDED RELEASE ORAL at 07:29

## 2018-05-14 RX ADMIN — METOPROLOL TARTRATE SCH MG: 100 TABLET, FILM COATED ORAL at 07:29

## 2018-05-14 RX ADMIN — METOPROLOL TARTRATE SCH MG: 100 TABLET, FILM COATED ORAL at 20:39

## 2018-05-14 RX ADMIN — IPRATROPIUM BROMIDE AND ALBUTEROL SULFATE SCH ML: .5; 3 SOLUTION RESPIRATORY (INHALATION) at 11:10

## 2018-05-14 RX ADMIN — BETHANECHOL CHLORIDE SCH MG: 25 TABLET ORAL at 07:29

## 2018-05-14 RX ADMIN — GABAPENTIN SCH MG: 600 TABLET, FILM COATED ORAL at 07:29

## 2018-05-14 RX ADMIN — CEFTRIAXONE SODIUM SCH MLS/HR: 1 INJECTION, POWDER, FOR SOLUTION INTRAVENOUS at 06:05

## 2018-05-14 RX ADMIN — IPRATROPIUM BROMIDE AND ALBUTEROL SULFATE SCH ML: .5; 3 SOLUTION RESPIRATORY (INHALATION) at 23:17

## 2018-05-14 RX ADMIN — GUAIFENESIN SCH MG: 600 TABLET, EXTENDED RELEASE ORAL at 20:40

## 2018-05-14 RX ADMIN — LISINOPRIL SCH MG: 10 TABLET ORAL at 08:28

## 2018-05-14 RX ADMIN — GABAPENTIN SCH MG: 600 TABLET, FILM COATED ORAL at 14:18

## 2018-05-14 RX ADMIN — IPRATROPIUM BROMIDE AND ALBUTEROL SULFATE SCH ML: .5; 3 SOLUTION RESPIRATORY (INHALATION) at 19:42

## 2018-05-14 RX ADMIN — FLUTICASONE PROPIONATE AND SALMETEROL SCH PUFF: 50; 250 POWDER RESPIRATORY (INHALATION) at 20:39

## 2018-05-14 RX ADMIN — TAMSULOSIN HYDROCHLORIDE SCH MG: 0.4 CAPSULE ORAL at 07:29

## 2018-05-14 RX ADMIN — Medication SCH MG: at 20:40

## 2018-05-14 RX ADMIN — IPRATROPIUM BROMIDE AND ALBUTEROL SULFATE SCH ML: .5; 3 SOLUTION RESPIRATORY (INHALATION) at 03:20

## 2018-05-14 RX ADMIN — IPRATROPIUM BROMIDE AND ALBUTEROL SULFATE SCH ML: .5; 3 SOLUTION RESPIRATORY (INHALATION) at 07:14

## 2018-05-14 RX ADMIN — IPRATROPIUM BROMIDE AND ALBUTEROL SULFATE SCH ML: .5; 3 SOLUTION RESPIRATORY (INHALATION) at 14:55

## 2018-05-14 RX ADMIN — LACTOBACILLUS TAB SCH TAB: TAB at 20:38

## 2018-05-14 RX ADMIN — ALUMINUM ZIRCONIUM TRICHLOROHYDREX GLY SCH EA: 0.2 STICK TOPICAL at 07:15

## 2018-05-14 RX ADMIN — LABETALOL HYDROCHLORIDE PRN MG: 5 INJECTION, SOLUTION INTRAVENOUS at 03:31

## 2018-05-14 RX ADMIN — FLUTICASONE PROPIONATE AND SALMETEROL SCH PUFF: 50; 250 POWDER RESPIRATORY (INHALATION) at 07:29

## 2018-05-14 RX ADMIN — AZITHROMYCIN MONOHYDRATE SCH MLS/HR: 500 INJECTION, POWDER, LYOPHILIZED, FOR SOLUTION INTRAVENOUS at 03:36

## 2018-05-14 RX ADMIN — ALUMINUM ZIRCONIUM TRICHLOROHYDREX GLY SCH EA: 0.2 STICK TOPICAL at 16:00

## 2018-05-14 RX ADMIN — GABAPENTIN SCH MG: 600 TABLET, FILM COATED ORAL at 20:41

## 2018-05-14 RX ADMIN — DOCUSATE SODIUM SCH MG: 100 CAPSULE, LIQUID FILLED ORAL at 20:39

## 2018-05-14 RX ADMIN — BETHANECHOL CHLORIDE SCH MG: 25 TABLET ORAL at 20:41

## 2018-05-14 NOTE — DIAGNOSTIC IMAGING REPORT
CT OF THE CHEST WITHOUT IV CONTRAST



CLINICAL HISTORY: Shortness of breath. Persistent left lower lobe

infiltrate/effusion. Lymphadenopathy.    



COMPARISON STUDY:  Chest CT November 27, 2017 and chest radiograph May 13, 2018.





CT DOSE: 651.46 mGycm



TECHNIQUE:  Axial images of the chest were obtained without IV contrast.  Images

were reviewed in the axial, sagittal, and coronal planes. IV contrast was not

administered for this examination.  A dose lowering technique was utilized

adhering to the principles of ALARA.





FINDINGS:  The heart is moderately enlarged. There is moderate coronary artery

calcification. No pericardial effusion is noted. Mildly enlarged mediastinal and

bilateral hilar lymph nodes have slightly decreased in size since exam of

November 27, 2017. Index prevascular node measures 1.3 cm in short axis

diameter. Partially calcified nodes are noted. There is a small right pleural

effusion. No pneumothorax is present. Left lung volume loss with apparent

postoperative findings within the left chest wall are again noted. Mild

interlobular septal thickening is noted. Left lung airspace opacity is noted.

Left lower lobe opacity has improved when compared to exam of November 27, 2017.

Patchy right upper lobe airspace opacities are present. No suspicious bony

lesions are noted. A suspected right renal cyst is partially imaged. A left

adrenal adenoma is unchanged. There are gallstones within the gallbladder.



IMPRESSION:  



1. Scattered bilateral airspace opacities which favor pneumonia. Alveolar

pulmonary edema could appear similar.



2. Interlobular septal thickening suggestive of mild interstitial pulmonary

edema.



3. Small right pleural effusion.



4. Left lung airspace opacities, slightly improved since exam of November 27, 2017.



5. Mild mediastinal and bilateral hilar lymphadenopathy which is improved since

previous exam.







Electronically signed by:  Eric Melendez M.D.

5/14/2018 10:02 AM



Dictated Date/Time:  5/14/2018 9:50 AM

## 2018-05-14 NOTE — PROGRESS NOTE
Subjective


Date of Service:


May 14, 2018.


Subjective


Pt evaluation today including:  conversation w/ patient, conversation w/ family 

(wife at bedside), physical exam, chart review, lab review, review of studies (

CT chest), conversation w/ consultant (pulmonary, Dr. Abraham), review of 

inpatient medication list


Pain:  none


PO Intake:  normal; improved


Voiding:  no voiding problems


tele stable overnight


rate-controlled a. fib


feels much better


cough much better


denies dyspnea 


asks about discharge 


we discussed his CT results during a 2nd afternoon visit





Problem List


Medical Problems:


(1) Acute respiratory failure with hypoxia


Status: Acute  





(2) Atrial fibrillation with RVR


Status: Acute  





(3) Congestive heart failure


Status: Acute  





(4) Heart failure


Status: Acute  





(5) Hypoxia


Status: Acute  





(6) Left lower lobe pneumonia


Status: Acute  





(7) Pneumonia involving left lung


Status: Acute  











Review of Systems


Constitutional:  No fever, No chills


Respiratory:  + cough, No wheezing, No shortness of breath


Cardiac:  No chest pain, No orthopnea


Abdomen:  No pain





Objective


Vital Signs











  Date Time  Temp Pulse Resp B/P (MAP) Pulse Ox O2 Delivery O2 Flow Rate FiO2


 


5/14/18 15:18 36.3 66 19 162/85 (110) 92 Nasal Cannula 2.0 


 


5/14/18 14:55  99 16  98 Nasal Cannula 2.0 


 


5/14/18 11:44      Nasal Cannula 2.0 


 


5/14/18 11:38 36.9 97 18 176/90 (118) 92   


 


5/14/18 11:11  108 16  94 Room Air  


 


5/14/18 07:45      Nasal Cannula 2.0 


 


5/14/18 07:14  84 16  96 Nasal Cannula 2.0 


 


5/14/18 07:07 36.9 101 20 160/84 (109) 97   


 


5/14/18 04:02  91 20 150/81 (104) 93 Nasal Cannula 2.0 


 


5/14/18 04:00     94 Nasal Cannula 2.0 


 


5/14/18 03:15 37.0 94 18 190/73 (112) 95 Nasal Cannula 3.5 


 


5/14/18 00:01     92 Room Air 3.0 


 


5/13/18 23:41 36.5 101 18 120/67 (84) 92 Room Air  


 


5/13/18 20:00     92 Nasal Cannula 3.0 


 


5/13/18 19:26 36.8 92 22 180/83 (115) 92 Nasal Cannula 3.0 


 


5/13/18 19:09  98 18  92 Nasal Cannula 2.0 











Physical Exam


General Appearance:  no apparent distress, + obese


ENT:  pharynx normal


Neck:  no JVD


Respiratory/Chest:  no respiratory distress, no accessory muscle use, + 

decreased breath sounds (left base), + rales (scant, both bases)


Cardiovascular:  no gallop, no murmur, + irregularly irregular


Abdomen:  normal bowel sounds, non tender, soft, no organomegaly


Extremities:  + pedal edema (trace b/l)


Neurologic/Psychiatric:  alert, oriented x 3





Laboratory Results





Last 24 Hours








Test


  5/14/18


04:11


 


White Blood Count 7.38 K/uL 


 


Red Blood Count 3.61 M/uL 


 


Hemoglobin 10.9 g/dL 


 


Hematocrit 32.8 % 


 


Mean Corpuscular Volume 90.9 fL 


 


Mean Corpuscular Hemoglobin 30.2 pg 


 


Mean Corpuscular Hemoglobin


Concent 33.2 g/dl 


 


 


Platelet Count 108 K/uL 


 


Mean Platelet Volume 9.8 fL 


 


Neutrophils (%) (Auto) 69.8 % 


 


Lymphocytes (%) (Auto) 18.8 % 


 


Monocytes (%) (Auto) 8.4 % 


 


Eosinophils (%) (Auto) 2.6 % 


 


Basophils (%) (Auto) 0.3 % 


 


Neutrophils # (Auto) 5.15 K/uL 


 


Lymphocytes # (Auto) 1.39 K/uL 


 


Monocytes # (Auto) 0.62 K/uL 


 


Eosinophils # (Auto) 0.19 K/uL 


 


Basophils # (Auto) 0.02 K/uL 


 


RDW Standard Deviation 49.9 fL 


 


RDW Coefficient of Variation 15.1 % 


 


Immature Granulocyte % (Auto) 0.1 % 


 


Immature Granulocyte # (Auto) 0.01 K/uL 


 


Prothrombin Time 19.3 SECONDS 


 


Prothromb Time International


Ratio 1.9 


 


 


Sodium Level 140 mmol/L 


 


Potassium Level 3.5 mmol/L 


 


Chloride Level 104 mmol/L 


 


Carbon Dioxide Level 32 mmol/L 


 


Anion Gap 4.0 mmol/L 


 


Blood Urea Nitrogen 13 mg/dl 


 


Creatinine 1.51 mg/dl 


 


Est Creatinine Clear Calc


Drug Dose 41.5 ml/min 


 


 


Estimated GFR (


American) 50.2 


 


 


Estimated GFR (Non-


American 43.3 


 


 


BUN/Creatinine Ratio 8.3 


 


Random Glucose 115 mg/dl 


 


Calcium Level 8.1 mg/dl 











Assessment and Plan


78yo male -





1.  RUL pneumonia - clinically improved.  Day #2 of rocephin/zithromax.


D/c IV therapy, change to levaquin tomorrow.  Complete 7 day course. 


Blood cx's thus far negative.  


CT chest today confirmed the RUL pneumonia and possible minor b/l infiltrates 

in other lobes as well. 





2.  acute/chronic diastolic CHF - improving; lasix IV again today. 


Cont BB.


Re-eval in am. 





3.  CKD stage 3 - creatinine today is at baseline and in fact slightly better.  

BMP in am to ensure stability.  





4.  uncontrolled HTN - patient reports BPs are always high outside the 

hospital.  Tolerated the low-dose ACE yesterday with stable creatinine.


Will start 10mg lisinopril daily.  


Cont BB. 





5.  a. fib - rates controlled. 


INR 1.9 today; INR again in am. 


Warfarin 2mg daily. 





6.  chronic hypoxic respiratory failure on home o2 - 2nd o COPD - cont inhalers

, nebs, etc.  O2 sats are stable.





7.  severe COPD - not in exacerbation at this time.  Defer on PO/IV steroids.  





8.  ?weight loss, persistent LLL opacities - CT today w/o evidence of obvious 

malignancy. 





9.  hypokalemia - resolved. 





10.  BPH - alpha blocker. 





11.  wall motion abnormality on echo - will presume he has CAD until otherwise 

proven.  Should be on BB, statin, and aspirin at minimum.  





12.  dysphagia - has had w/u in the past for this in 2017.


Reconsulted speech; dental soft diet recommended. .





13.  chronic LLL opacity on chest imaging - CT today with modestly improved LLL 

infiltrates and improved lymphadenopathy suggesting more of a benign entity.


I informed Dr. Abraham, his primary pulmonologist, about the CT chest.  








cleared for home by PT, OT


wife updated at bedside





suspect d/c home next 1-2 days


Continued [facility] stay due to:  multiple IV medications needed


Discharge planning:  home

## 2018-05-15 VITALS
SYSTOLIC BLOOD PRESSURE: 169 MMHG | DIASTOLIC BLOOD PRESSURE: 81 MMHG | HEART RATE: 89 BPM | TEMPERATURE: 97.88 F | OXYGEN SATURATION: 96 %

## 2018-05-15 VITALS
SYSTOLIC BLOOD PRESSURE: 161 MMHG | TEMPERATURE: 97.88 F | OXYGEN SATURATION: 92 % | DIASTOLIC BLOOD PRESSURE: 89 MMHG | HEART RATE: 98 BPM

## 2018-05-15 VITALS
OXYGEN SATURATION: 94 % | DIASTOLIC BLOOD PRESSURE: 92 MMHG | HEART RATE: 79 BPM | SYSTOLIC BLOOD PRESSURE: 151 MMHG | TEMPERATURE: 98.06 F

## 2018-05-15 VITALS
TEMPERATURE: 98.06 F | DIASTOLIC BLOOD PRESSURE: 92 MMHG | HEART RATE: 92 BPM | SYSTOLIC BLOOD PRESSURE: 151 MMHG | OXYGEN SATURATION: 96 %

## 2018-05-15 VITALS — OXYGEN SATURATION: 96 % | HEART RATE: 95 BPM

## 2018-05-15 VITALS — HEART RATE: 92 BPM | OXYGEN SATURATION: 96 %

## 2018-05-15 LAB
BUN SERPL-MCNC: 15 MG/DL (ref 7–18)
CALCIUM SERPL-MCNC: 8.4 MG/DL (ref 8.5–10.1)
CO2 SERPL-SCNC: 33 MMOL/L (ref 21–32)
CREAT SERPL-MCNC: 1.54 MG/DL (ref 0.6–1.4)
GLUCOSE SERPL-MCNC: 101 MG/DL (ref 70–99)
INR PPP: 1.9 (ref 0.9–1.1)
POTASSIUM SERPL-SCNC: 3.7 MMOL/L (ref 3.5–5.1)
SODIUM SERPL-SCNC: 142 MMOL/L (ref 136–145)

## 2018-05-15 RX ADMIN — IPRATROPIUM BROMIDE AND ALBUTEROL SULFATE SCH ML: .5; 3 SOLUTION RESPIRATORY (INHALATION) at 07:03

## 2018-05-15 RX ADMIN — ALUMINUM ZIRCONIUM TRICHLOROHYDREX GLY SCH EA: 0.2 STICK TOPICAL at 00:00

## 2018-05-15 RX ADMIN — TAMSULOSIN HYDROCHLORIDE SCH MG: 0.4 CAPSULE ORAL at 07:19

## 2018-05-15 RX ADMIN — GABAPENTIN SCH MG: 600 TABLET, FILM COATED ORAL at 12:41

## 2018-05-15 RX ADMIN — LACTOBACILLUS TAB SCH TAB: TAB at 12:41

## 2018-05-15 RX ADMIN — IPRATROPIUM BROMIDE AND ALBUTEROL SULFATE SCH ML: .5; 3 SOLUTION RESPIRATORY (INHALATION) at 12:00

## 2018-05-15 RX ADMIN — IPRATROPIUM BROMIDE AND ALBUTEROL SULFATE SCH ML: .5; 3 SOLUTION RESPIRATORY (INHALATION) at 04:00

## 2018-05-15 RX ADMIN — GUAIFENESIN SCH MG: 600 TABLET, EXTENDED RELEASE ORAL at 07:21

## 2018-05-15 RX ADMIN — LISINOPRIL SCH MG: 10 TABLET ORAL at 07:21

## 2018-05-15 RX ADMIN — Medication SCH MG: at 07:22

## 2018-05-15 RX ADMIN — FLUTICASONE PROPIONATE AND SALMETEROL SCH PUFF: 50; 250 POWDER RESPIRATORY (INHALATION) at 07:20

## 2018-05-15 RX ADMIN — METOPROLOL TARTRATE SCH MG: 100 TABLET, FILM COATED ORAL at 07:22

## 2018-05-15 RX ADMIN — BETHANECHOL CHLORIDE SCH MG: 25 TABLET ORAL at 07:19

## 2018-05-15 RX ADMIN — GABAPENTIN SCH MG: 600 TABLET, FILM COATED ORAL at 07:18

## 2018-05-15 RX ADMIN — LACTOBACILLUS TAB SCH TAB: TAB at 07:21

## 2018-05-15 NOTE — DISCHARGE INSTRUCTIONS
Discharge Instructions


Date of Service


May 15, 2018.





Admission


Reason for Admission:  Shortness Of Breath





Discharge


Discharge Diagnosis / Problem:  Shortness of breath due to pneumonia and 

diastolic congestive heart failure





Discharge Goals


Goal(s):  Learn about illness, Diagnostic testing, Therapeutic intervention





Activity Recommendations


Activity Limitations:  resume your previous activity





.





Instructions / Follow-Up


Instructions / Follow-Up





From Dr. Hua:





Your shortness of breath was due to a combination of pneumonia in the right 

lung as well as fluid build-up in the lungs from diastolic congestive heart 

failure . 


You improved nicely with intravenous diuretics (lasix) and antibiotics.  





1.  Pneumonia - please take 3 more doses of levaquin (levofloxacin) antibiotic.

  


Take your first dose on Wednesday, 5/16/18.


Take your second dose on Friday, 5/18/18.


And take your third and final dose on Sunday, 5/20/18.  


Prescription sent to [facility] for you. 








2.  For cough/congestion - you can take over-the-counter mucinex up to 1200mg 

twice a day as needed/desired.  


Continue on your nebulizer treatments and inhalers as previous.  








3.  COPD - Dr. Abraham wants you to take your DULERA inhaler TWICE A DAY every day

.  Take 2 puffs in the morning and 2 puffs in the evening every day.  Rinse 

your mouth with water after its use.  I have given you a written prescription 

for this if you get home and need a refill on it. 








4.  High blood pressure - your blood pressures throughout your stay were 

elevated.  We have started you on lisinopril once a day.  Take 10mg every day.  


Prescription sent to [facility] for you. 








5.  Diastolic Congestive Heart Failure - 


* this is a form of congestive heart failure that results from the heart "not 

relaxing well"


* high blood pressure, too much salt, too much fluid intake, etc all cause 

diastolic congestive heart failure to become active


* if you control your blood pressure, watch your salt and fluid intake, and 

check your weight every morning it can be controlled nicely


* please check your weight every morning on the same scale at your house


 * your weight today at [facility] is 238 pounds


* if you gain more than 2-3 pounds in 1-2 days please TAKE YOUR LASIX (

FUROSEMIDE) AND CALL YOUR DOCTORS THAT YOUR WEIGHT HAS GONE UP


* again you will only take lasix if you are gaining fluid weight; if your 

weight is stable you do not need to take the lasix 





Additional congestive heart failure instructions - 





Call your Primary Care doctor if any of the following symptoms or problems 

start or get worse:





* Shortness of breath or difficulty breathing


* Wake up at night short of breath


* Chest pain


* Cough


* Swelling of your hands, feet, or legs


* More fatigued or tired with your normal activity


* Palpitations - sudden fast heart beats





WEIGHT





* Weigh yourself every morning after using the bathroom.


* Use the same scale.


* Wear the same amount of clothing.


* Write your weight down on a chart.


* Call your Primary Care doctor if you gain more than 2-3 pounds in 1-2 days.  

Start your lasix (furosemide) if you start to have weight gains. The weight 

gain often suggests you are taking on excess fluid.





MEDICATIONS





* Use this discharge instruction sheet for medication instructions.


* Take your medications at the time your doctor ordered.


* Do not skip a dose of your medicines.


* If you miss a dose of medicine, take it as soon as possible, but DO NOT 

DOUBLE A DOSE.


* Read your medicine information when you get home.


* Know all of the side effects of your medicine.  If in doubt, ask your 

pharmacist


* Call your Primary Care doctor's office if you have any side effects.


* Be sure all of your doctors know what medicine and herbs you take (including 

cold, flu, and herbal medicine).








Take the following with you to your follow-up doctor appointments:





* Weight Chart


* Medication List


* List of questions








6.  There was an additional abnormality on your echocardiogram of your heart 

that I would like for you to see the cardiologist about.  Please ask your 

family doctor for a referral to the cardiologist.  There was some suggestion 

that a small part of the muscle of the left heart may not be functioning as 

well as previous.  








7.  Your INR (coumadin/warfarin level) today is 1.9.  Know that the antibiotics 

may cause the INR to go up higher than desired.  Please have your INR repeated 

in 3-4 days to ensure it is within the goal range of 2-3.  








8.  Take the lactinex probiotics to prevent diarrhea from the levaquin 

antibiotic.  








9.  Follow-up - see separate section detailing all of your follow-up 

appointment dates/times. 








10.  Your CAT scan of the lungs showed that the previous abnormality in the 

left lower lobe has IMPROVED.  The swollen glands in the lungs that were seen 6 

months ago are ALSO IMPROVED.  Dr. Abraham is aware that you had this CAT scan.  








11.  Recommendations from the speech therapist - 


* follow a MOIST dental soft diet


* do not use straws


* excercise "Aspiration precautions" - these are things you can do to prevent 

food/beverage going into the lungs; remain upright and alert for all meals, 

alternate 


 consistencies of foods (example - don't eat a portion of meat all at once; 

take a bite of meat, then sip some beverage, then have a different type of food

, then sip more beverage, etc)


* take small bites of food and small sips of fluids at a slow rate 








11.  Return to [facility] if - 


* you are having worsening shortness of breath despite your oxygen, inhalers, 

nebs, etc


* you are having chest pain


* you have fever over 100.5 degrees


* you develop severe diarrhea 


* any other concerns





Current Hospital Diet


Patient's current hospital diet: AHA Diet (Heart Healthy)





Discharge Diet


Recommended Diet:  AHA Diet (Heart Healthy)


Fluid Restriction:  1800 ml (7 cups)





Procedures


Procedures Performed:  


1.  CAT scan of lungs





2.  echocardiogram





Pending Studies


Studies pending at discharge:  no





Medical Emergencies








.


Who to Call and When:





Call 911 or go to the Emergency Room if:





* If at any time you feel your situation is an emergency


* You have tightness or pain in your chest that does not go away with rest or 

Nitroglycerin


* You are very short of breath even with rest





.





Non-Emergent Contact


Non-Emergency issues call your:  Primary Care Provider


Call Non-Emergent contact if:  temperature is above 100.5, you have any 

medication questions





.


.








"Provider Documentation" section prepared by Azar Hua.








.

## 2018-05-15 NOTE — DISCHARGE SUMMARY
Discharge Summary


Date of Service


May 15, 2018.





Discharge Summary


Admission Date:


May 13, 2018 at 02:39


Discharge Date:  May 15, 2018


Discharge Disposition:  Home with services


Principal Diagnosis:  community-acquired pneumonia, RUL


Problems/Secondary Diagnoses:


1.  acute/chronic diastolic CHF


2.  chronic hypoxic respiratory failure on home O2


3.  COPD


4.  chronic LLL infiltrate, improved on imaging this admission


5.  mild dysphagia


6.  CKD stage 3


7.  atrial fibrillation 


8.  new wall motion abnormality on echo - cardiology follow-up recommended


9.  HTN 


10.  BPH


11.  morbid obesity - BMI 46.7


Immunizations:  


   Have You Had Influenza Vaccine:  Unknown


   Influenza Vaccine Date:  Oct 11, 2011


   History of Tetanus Vaccine?:  Unknown


   History of Pneumococcal:  Unknown


   Pneumococcal Date:  Ej 10, 2008


   History of Hepatitis B Vaccine:  Unknown


Procedures:


1.  Echocardiogram - 


 -- Conclusions --


  *  There is mild concentric left ventricular hypertrophy.


  *  Ejection Fraction = 60-65%.


  *  Mild aortic regurgitation.


  *  There is mild to moderate mitral regurgitation.


  *  Right ventricular systolic pressure is normal.


  *  Borderline aortic root dilatation.


  *  Mild basal inferior hypokinesis.  





2.  CT chest - 


IMPRESSION:  


1. Scattered bilateral airspace opacities which favor pneumonia. Alveolar 

pulmonary edema could appear similar.


2. Interlobular septal thickening suggestive of mild interstitial pulmonary 

edema.


3. Small right pleural effusion.


4. Left lung airspace opacities, slightly improved since exam of November 27, 2017.


5. Mild mediastinal and bilateral hilar lymphadenopathy which is improved since 

previous exam.


Consultations:


PT, OT, speech therapy





Medication Reconciliation


New Medications:  


Furosemide (Lasix) 20 Mg Tab


20 MG PO QAM PRN for weight gain, #30 TAB 0 Refills





Home O2 Therapy (Oxygen)  Gas


2 LITERS NA CONTINOUS, #1 UNIT 0 Refills





Lactobacillus Acidophilus (Floranex) 1 Tab Tab


4 TAB PO TIDM for 7 Days, #84 TAB 0 Refills





Levofloxacin (Levofloxacin) 750 Mg Tab


750 MG PO AS DIRECTED, #3 TAB 0 Refills


take first dose on 5/16/18, second dose on 5/18/18, and third dose on


 5/20/18.


Lisinopril (Zestril) 10 Mg Tab


10 MG PO QAM, #30 TAB 2 Refills


for high blood pressure


 


Changed Medications:  


Mometasone Furoate-Formoterol (Dulera 200/5 Mcg) 1 Aer Aer


2 PUFFS INH BID, #1 INHALER 2 Refills (Changed from: HS; Refills: )





Warfarin Sodium (Warfarin Sodium) 2 Mg Tab


2 MG PO DAILY, #30 TABS 0 Refills (Changed from: HELD TODAY; Refills: ; Removed 

Instructions)





 


Continued Medications:  


Acetaminophen (Tylenol Arthritis Ext Rel) 650 Mg Cplt


1300 MG PO DAILY PRN for Pain, CAP





Bethanechol Chloride (Bethanechol Chloride) 50 Mg Tab


50 MG PO BID





Cholecalciferol (Vitamin D3) 2,000 Unit Cap


2000 UNITS PO DAILY





Docusate Sodium (Stool Softener) 100 Mg Cap


100 MG PO DAILY





Gabapentin (Gabapentin) 600 Mg Tab


600 MG PO TID





Guaifenesin Ext Rel (Mucinex Ext Rel) 600 Mg Tab


600 MG PO Q12 PRN for CONGESTION, TAB





Ipratropium-Albuterol (Combivent Respimat) 1 Aer Aer


2 PUFFS INH QID PRN for SOB/Wheezing





Levalbuterol (Levalbuterol HCl) 1.25 Mg/3 Ml Nebu


1 DOSE INFIL Q8





Loteprednol Etabonate-Tobramyc (Zylet) 1 Marie Marie


1 DROPS OPB QAM, #5 ML





Melatonin (Melatonin Maximum Strengt) 5 Mg Tab


1 TAB PO HS





Metoprolol Tartrate (Metoprolol Tartrate) 100 Mg Tab


100 MG PO BID





Tamsulosin Hcl (Flomax) 0.4 Mg Cap


0.4 MG PO DAILY





Trazodone Hcl (Trazodone) 50 Mg Tab


50 MG PO HS PRN for Sleep











Discharge Exam


Physical Exam:  


   General Appearance:  no apparent distress, + obese


   ENT:  pharynx normal


   Neck:  no JVD


   Respiratory/Chest:  no respiratory distress, no accessory muscle use, + 

decreased breath sounds (left base), + pertinent finding (no wheeze or crackle)


   Cardiovascular:  no gallop, no murmur, normal peripheral pulses, + 

irregularly irregular


   Abdomen / GI:  normal bowel sounds, non tender, soft, no organomegaly


   Extremities:  no pedal edema


   Neurologic/Psychiatric:  alert, oriented x 3





Hospital Course


HISTORY OF PRESENT ILLNESS:


Patient is a 78yo male with history of COPD on 2L home O2 continuously as well 

as atrial fibrillation on chronic coumadin who presented with 1 day of 

progressive shortness of breath.  Patient also reported some orthopnea and a 

cough that was initially dry but had an episode of hemoptysis.  Patient 

reported subjective fevers, chills and sweats x 1 day as well as generalized 

weakness and fatigue.  Family reported oxygen saturation in the 70's just prior 

to admission that resolved with increasing the oxygen to 2.5 liters.  Initial 

chest x-ray was concerning for pulmonary edema +/- pneumonia. 








HOSPITAL COURSE: 


The patient's presenting shortness of breath appeared to be a combination of 

pneumonia as well as acute/chronic diastolic CHF.  


He was treated with IV/PO antibiotics as well as IV/PO lasix.  


He made rapid improvement in all symptoms with these measures.  


On day of discharge his O2 sats with his NC O2 in place were in the mid 90s (

both at rest and with walking).  


CT chest was performed given his persistent LLL opacity seen on multiple prior 

chest studies and this showed improvement in the chronic LLL infiltrate.


It also confirmed the presence of RUL pneumonia, some other scattered areas of 

pneumonia, and pulmonary edema.  


Blood cultures remained negative while hospitalized.  


Dr. Abraham, his primary pulmonologist, was made aware of the improved chest CT 

findings.  





With respect to the diastolic CHF the patient was given a prescription for 

lasix 20mg to be used on a PRN basis for weight gain.


Discharge weight was 238 pounds.  


CHF instructions were provided. 





The RUL pneumonia was either community-acquired in origin vs. aspiration; the 

former was favored.  


He will complete a course of levaquin after discharge. 


Fortunately his COPD remained stable and without exacerbation while here.  





Throughout the stay the patient's blood pressures were elevated.  For that 

reason low-dose lisinopril was added. 


Creatinine remained stable despite the addition of the ACE.  Discharge 

creatinine was 1.5 which is his baseline. 





INR was 1.9 on day of discharge.  He will remain on 2mg of coumadin daily.  

Repeat INR within 5 days of discharge was advised given his antibiotic usage. 





The patient complained of mild dysphagia while hospitalized.  He was placed on 

a dental soft diet by speech therapy.  


No overt aspiration was seen by the speech therapist on bedside swallow 

evaluation.  





Echocardiogram showed preserved ejection fraction but appeared to demonstrate a 

new wall motion abnormality in the inferior wall. 


Despite this new finding he reported no ischemic symptoms. 


With that said cardiology consultation as an outpatient was recommended.  





Prior to discharge the patient was cleared by PT/OT for home.


He was set up with a home health nurse for medication compliance & other 

teaching.


Total Time Spent:  Greater than 30 minutes


This includes examination of the patient, discharge planning, medication 

reconciliation, and communication with other providers.





Discharge Instructions


Please refer to the electronic Patient Visit Report (Discharge Instructions) 

for additional information.





Follow-Up


1.  Andria Disla PA-C on Monday May 21st at 3:00 pm.


2.  [facility]'s Nephrology Office with Dr. Faye on 

Wednesday May 23rd at 12:45 pm.


3.  [facility]'s Pulmonology Office with Dr. Abraham on Friday June 15th at 2:30 pm.


4.  Recommend consultation with [facility] due to new wall motion 

abnormality seen on echo





Additional Copies To


Hammad Abraham DO; Colin Faye M.D.; Roshan Chavez M.D.; 

Andria DislaPMING.

## 2018-05-17 ENCOUNTER — HOSPITAL ENCOUNTER (OUTPATIENT)
Dept: HOSPITAL 45 - C.LABBFT | Age: 78
Discharge: HOME | End: 2018-05-17
Attending: INTERNAL MEDICINE
Payer: COMMERCIAL

## 2018-05-17 DIAGNOSIS — I12.9: Primary | ICD-10-CM

## 2018-05-17 DIAGNOSIS — N18.3: ICD-10-CM

## 2018-05-17 DIAGNOSIS — E55.9: ICD-10-CM

## 2018-05-17 DIAGNOSIS — R60.9: ICD-10-CM

## 2018-05-17 LAB
ALBUMIN SERPL-MCNC: 3.2 GM/DL (ref 3.4–5)
BUN SERPL-MCNC: 14 MG/DL (ref 7–18)
CALCIUM SERPL-MCNC: 9.3 MG/DL (ref 8.5–10.1)
CO2 SERPL-SCNC: 31 MMOL/L (ref 21–32)
CREAT SERPL-MCNC: 1.72 MG/DL (ref 0.6–1.4)
EOSINOPHIL NFR BLD AUTO: 175 K/UL (ref 130–400)
GLUCOSE SERPL-MCNC: 204 MG/DL (ref 70–99)
HCT VFR BLD CALC: 37 % (ref 42–52)
HGB BLD-MCNC: 12.2 G/DL (ref 14–18)
MCH RBC QN AUTO: 30.3 PG (ref 25–34)
MCHC RBC AUTO-ENTMCNC: 33 G/DL (ref 32–36)
MCV RBC AUTO: 92 FL (ref 80–100)
PHOSPHATE SERPL-MCNC: 3 MG/DL (ref 2.5–4.9)
PMV BLD AUTO: 10 FL (ref 7.4–10.4)
POTASSIUM SERPL-SCNC: 3.8 MMOL/L (ref 3.5–5.1)
RED CELL DISTRIBUTION WIDTH CV: 15 % (ref 11.5–14.5)
RED CELL DISTRIBUTION WIDTH SD: 50.9 FL (ref 36.4–46.3)
SODIUM SERPL-SCNC: 140 MMOL/L (ref 136–145)
WBC # BLD AUTO: 7 K/UL (ref 4.8–10.8)

## 2018-05-21 ENCOUNTER — HOSPITAL ENCOUNTER (EMERGENCY)
Dept: HOSPITAL 45 - C.EDB | Age: 78
Discharge: HOME | End: 2018-05-21
Payer: COMMERCIAL

## 2018-05-21 VITALS — HEART RATE: 74 BPM | SYSTOLIC BLOOD PRESSURE: 145 MMHG | DIASTOLIC BLOOD PRESSURE: 100 MMHG | OXYGEN SATURATION: 99 %

## 2018-05-21 VITALS — HEIGHT: 72.01 IN

## 2018-05-21 VITALS — OXYGEN SATURATION: 98 %

## 2018-05-21 VITALS — TEMPERATURE: 97.34 F

## 2018-05-21 DIAGNOSIS — Z83.3: ICD-10-CM

## 2018-05-21 DIAGNOSIS — Z82.49: ICD-10-CM

## 2018-05-21 DIAGNOSIS — J44.9: ICD-10-CM

## 2018-05-21 DIAGNOSIS — Z79.01: ICD-10-CM

## 2018-05-21 DIAGNOSIS — Z79.899: ICD-10-CM

## 2018-05-21 DIAGNOSIS — N18.3: ICD-10-CM

## 2018-05-21 DIAGNOSIS — I12.9: ICD-10-CM

## 2018-05-21 DIAGNOSIS — Z88.1: ICD-10-CM

## 2018-05-21 DIAGNOSIS — R42: Primary | ICD-10-CM

## 2018-05-21 DIAGNOSIS — Z99.81: ICD-10-CM

## 2018-05-21 DIAGNOSIS — Z87.01: ICD-10-CM

## 2018-05-21 DIAGNOSIS — E86.0: ICD-10-CM

## 2018-05-21 DIAGNOSIS — I48.91: ICD-10-CM

## 2018-05-21 DIAGNOSIS — Z84.1: ICD-10-CM

## 2018-05-21 LAB
ALBUMIN SERPL-MCNC: 3 GM/DL (ref 3.4–5)
ALP SERPL-CCNC: 64 U/L (ref 45–117)
ALT SERPL-CCNC: 19 U/L (ref 12–78)
AST SERPL-CCNC: 17 U/L (ref 15–37)
BASOPHILS # BLD: 0.04 K/UL (ref 0–0.2)
BASOPHILS NFR BLD: 0.6 %
BUN SERPL-MCNC: 13 MG/DL (ref 7–18)
CALCIUM SERPL-MCNC: 8.7 MG/DL (ref 8.5–10.1)
CO2 SERPL-SCNC: 32 MMOL/L (ref 21–32)
CREAT SERPL-MCNC: 1.86 MG/DL (ref 0.6–1.4)
EOS ABS #: 0.22 K/UL (ref 0–0.5)
EOSINOPHIL NFR BLD AUTO: 169 K/UL (ref 130–400)
GLUCOSE SERPL-MCNC: 98 MG/DL (ref 70–99)
HCT VFR BLD CALC: 36.8 % (ref 42–52)
HGB BLD-MCNC: 12.1 G/DL (ref 14–18)
IG#: 0.01 K/UL (ref 0–0.02)
IMM GRANULOCYTES NFR BLD AUTO: 20.6 %
INR PPP: 2.4 (ref 0.9–1.1)
LYMPHOCYTES # BLD: 1.45 K/UL (ref 1.2–3.4)
MCH RBC QN AUTO: 29.8 PG (ref 25–34)
MCHC RBC AUTO-ENTMCNC: 32.9 G/DL (ref 32–36)
MCV RBC AUTO: 90.6 FL (ref 80–100)
MONO ABS #: 0.47 K/UL (ref 0.11–0.59)
MONOCYTES NFR BLD: 6.7 %
NEUT ABS #: 4.85 K/UL (ref 1.4–6.5)
NEUTROPHILS # BLD AUTO: 3.1 %
NEUTROPHILS NFR BLD AUTO: 68.9 %
PMV BLD AUTO: 9.9 FL (ref 7.4–10.4)
POTASSIUM SERPL-SCNC: 4.2 MMOL/L (ref 3.5–5.1)
PROT SERPL-MCNC: 6.9 GM/DL (ref 6.4–8.2)
PTT PATIENT: 38.5 SECONDS (ref 21–31)
RED CELL DISTRIBUTION WIDTH CV: 14.6 % (ref 11.5–14.5)
RED CELL DISTRIBUTION WIDTH SD: 48.1 FL (ref 36.4–46.3)
SODIUM SERPL-SCNC: 140 MMOL/L (ref 136–145)
WBC # BLD AUTO: 7.04 K/UL (ref 4.8–10.8)

## 2018-05-21 NOTE — EMERGENCY ROOM VISIT NOTE
History


Report prepared by Leandra:  Michelle Novoa


Under the Supervision of:  Dr. Lico Cid M.D.


First contact with patient:  16:28


Chief Complaint:  DIZZY


Stated Complaint:  DIZZY





History of Present Illness


The patient is a 77 year old male who presents to the Emergency Room with 

complaints of worsening weakness. He was referred to the ED by his PCP, Dr. Brewer, who he saw earlier today. The patient was discharged from the hospital 

on May 15th after a stay for pneumonia and cardiac complications. Today at his 

follow up visit, Dr. Brewer's office became concerned that the patient has 

become increasingly weak since discharge and had possibly confused his 

medications. The patient admits he fell yesterday, sliding down his shower 

door. He believes he fell because he has been weak, and denies hitting his head 

or sustaining any injuries. He states he did not mix up his medications, but 

his home health nurses may have.  He denies any cough or shortness of breath. 

He has not vomited or experienced diarrhea. He is still on daily Coumadin. The 

patient reports his blood pressure is normally around 130 systolic, and admits 

he knows it is lower than usual today at 113/70. He denies any abdominal pain 

or urinary symptoms. 





Per previous notes, when the patient was discharged, he was prescribed Lasix to 

be used as needed for weight gain.





   Source of History:  patient, treating provider (Dr. Brewer's office)


   Onset:  past few days


   Position:  other (global)


   Timing:  worsening


   Associated Symptoms:  No cough, No SOB, No vomiting, No abdominal pain, No 

diarrhea, No urinary symptoms





Review of Systems


See HPI for pertinent positives & negatives. A total of 10 systems reviewed and 

were otherwise negative.





Past Medical & Surgical


Medical Problems:


(1) ANTICOAGULANTS,LT,CURRENT USE


(2) ATRIAL FIBRILLATION


(3) Bacterial pneumonia


(4) Bacterial pneumonia, unspecified


(5) Benign essential hypertension


(6) CHRONIC KIDNEY DISEASE, STAGE III (MODERATE)


(7) COPD (chronic obstructive pulmonary disease)


(8) Shortness of breath








Family History





Diabetes mellitus


FHx: kidney disease


Heart disease


Hypertension


Kidney stone





Social History


Smoking Status:  Never Smoker


Alcohol Use:  none


Drug Use:  none


Marital Status:  


Housing Status:  lives with family


Occupation Status:  retired





Current/Historical Medications


Scheduled


Bethanechol Chloride (Bethanechol Chloride), 50 MG PO BID


Cholecalciferol (Vitamin D3), 2,000 UNITS PO DAILY


Docusate Sodium (Stool Softener), 100 MG PO DAILY


Gabapentin (Gabapentin), 600 MG PO TID


Home O2 Therapy (Oxygen), 2 LITERS NA CONTINOUS


Lactobacillus Acidophilus (Floranex), 4 TAB PO TIDM


Levalbuterol (Levalbuterol HCl), 1 DOSE INFIL Q8


Levofloxacin (Levofloxacin), 750 MG PO AS DIRECTED


Lisinopril (Zestril), 10 MG PO QAM


Loteprednol Etabonate-Tobramyc (Zylet), 1 DROPS OPB QAM


Melatonin (Melatonin Maximum Strengt), 1 TAB PO HS


Metoprolol Tartrate (Metoprolol Tartrate), 100 MG PO BID


Mometasone Furoate-Formoterol (Dulera 200/5 Mcg), 2 PUFFS INH BID


Tamsulosin Hcl (Flomax), 0.4 MG PO DAILY


Warfarin Sodium (Warfarin Sodium), 2 MG PO DAILY





Scheduled PRN


Acetaminophen (Tylenol Arthritis Ext Rel), 1,300 MG PO DAILY PRN for Pain


Furosemide (Lasix), 20 MG PO QAM PRN for weight gain


Guaifenesin Ext Rel (Mucinex Ext Rel), 600 MG PO Q12 PRN for CONGESTION


Ipratropium-Albuterol (Combivent Respimat), 2 PUFFS INH QID PRN for SOB/Wheezing


Trazodone Hcl (Trazodone), 50 MG PO HS PRN for Sleep





Allergies


Coded Allergies:  


     Ciprofloxacin (Verified  Adverse Reaction, Unknown, GI UPSET, 2/23/18)





Physical Exam


Vital Signs











  Date Time  Temp Pulse Resp B/P (MAP) Pulse Ox O2 Delivery O2 Flow Rate FiO2


 


5/21/18 19:23  74 20 145/100 99 Nasal Cannula 2.0 


 


5/21/18 18:13  83 21  99   


 


5/21/18 17:58  80 17  98   


 


5/21/18 17:53  83 23  99   


 


5/21/18 17:48  79 23  97   


 


5/21/18 17:43  86 19  99   


 


5/21/18 17:42    140/76    


 


5/21/18 17:23   18     


 


5/21/18 17:18   17     


 


5/21/18 17:13   26     


 


5/21/18 17:11    97/66    


 


5/21/18 17:10    148/69    


 


5/21/18 17:08  90 26  99   


 


5/21/18 17:07    123/77    


 


5/21/18 17:06  82 26 123/77 99 Room Air  





  84  148/69    





  82  97/66    


 


5/21/18 17:03  78 18     


 


5/21/18 16:58  86 22     


 


5/21/18 16:57  79      


 


5/21/18 16:52     98 Room Air  


 


5/21/18 16:19 36.3 84 20 113/70 99 Nasal Cannula 2.0 











Physical Exam


GENERAL: Patient is in no acute distress.


HEENT: No acute trauma, normocephalic atraumatic, mucous membranes moist, no 

nasal congestion, no scleral icterus.


NECK: No stridor, no adenopathy, no meningismus, trachea is midline.


LUNGS: Scattered crackles, no wheezing, breath sounds are equal, no respiratory 

distress. 


HEART: Irregular rhythm with normal rate, no murmurs. 


ABDOMEN: Soft, nontender, bowel sounds positive, no hernias, no peritonitis.


EXTREMITIES: No cyanosis or edema, full range of motion of all the joints 

without pain or difficulty, no signs for acute trauma.


NEUROLOGIC: Oriented x 3, no acute motor or sensory deficits, no focal weakness.


SKIN: No rash, no jaundice, no diaphoresis.





Medical Decision & Procedures


ER Provider


Diagnostic Interpretation:


Radiology results as stated below per my review and radiologist interpretation:





CHEST ONE VIEW PORTABLE





CLINICAL HISTORY: EVALUATE ALTERED MENTAL STATUS/WEAKNESS    





COMPARISON STUDY:  Chest CT May 14, 2018.





FINDINGS: Left hemithorax volume loss is unchanged. Cardiomediastinal silhouette


is stable. There is no pneumothorax. Bilateral airspace opacities have improved


since exam of May 14, 2018. 





IMPRESSION:  





1. Interval resolution of right lung airspace opacities with improvement in left


lung airspace opacities.


2. No change in left hemithorax volume loss.





Electronically signed by:  Eric Melendez M.D.


5/21/2018 4:55 PM








CT OF THE HEAD WITHOUT CONTRAST





CLINICAL HISTORY: EVALUATE ALTERED MENTAL STATUS/WEAKNESS    





COMPARISON STUDY:  Head CT April 10, 2012. 





CT DOSE: 1572.52 mGy.cm





TECHNIQUE: Helical axial images of the head were obtained without IV contrast.


Automated exposure control was utilized for the study.  A dose lowering


technique was utilized adhering to the principles of ALARA.





FINDINGS: No acute intracranial hemorrhage, midline shift or mass effect is


present. White matter hypodensities suggest moderate small vessel disease. A


small focus of encephalomalacia within the superior right frontal lobe is


unchanged since head CT of April 10, 2012. Ventricular system is unremarkable


for age. Basilar cisterns are patent. There are no findings to suggest acute


dural sinus thrombosis or acute territorial infarct. Moderate mucosal opinion


the right maxillary sinus is noted. A few locules of gas within the right


infratemporal fossa are likely venous in location.





IMPRESSION:  





1. No acute intracranial findings.


2. Moderate thickening of the right maxillary sinus. 





Electronically signed by:  Eric Melendez M.D.


5/21/2018 5:42 PM





Laboratory Results


5/21/18 16:59








Red Blood Count 4.06, Mean Corpuscular Volume 90.6, Mean Corpuscular Hemoglobin 

29.8, Mean Corpuscular Hemoglobin Concent 32.9, Mean Platelet Volume 9.9, 

Neutrophils (%) (Auto) 68.9, Lymphocytes (%) (Auto) 20.6, Monocytes (%) (Auto) 

6.7, Eosinophils (%) (Auto) 3.1, Basophils (%) (Auto) 0.6, Neutrophils # (Auto) 

4.85, Lymphocytes # (Auto) 1.45, Monocytes # (Auto) 0.47, Eosinophils # (Auto) 

0.22, Basophils # (Auto) 0.04





5/21/18 16:59

















Test


  5/21/18


16:59 5/21/18


18:40


 


White Blood Count


  7.04 K/uL


(4.8-10.8) 


 


 


Red Blood Count


  4.06 M/uL


(4.7-6.1) 


 


 


Hemoglobin


  12.1 g/dL


(14.0-18.0) 


 


 


Hematocrit 36.8 % (42-52)  


 


Mean Corpuscular Volume


  90.6 fL


() 


 


 


Mean Corpuscular Hemoglobin


  29.8 pg


(25-34) 


 


 


Mean Corpuscular Hemoglobin


Concent 32.9 g/dl


(32-36) 


 


 


Platelet Count


  169 K/uL


(130-400) 


 


 


Mean Platelet Volume


  9.9 fL


(7.4-10.4) 


 


 


Neutrophils (%) (Auto) 68.9 %  


 


Lymphocytes (%) (Auto) 20.6 %  


 


Monocytes (%) (Auto) 6.7 %  


 


Eosinophils (%) (Auto) 3.1 %  


 


Basophils (%) (Auto) 0.6 %  


 


Neutrophils # (Auto)


  4.85 K/uL


(1.4-6.5) 


 


 


Lymphocytes # (Auto)


  1.45 K/uL


(1.2-3.4) 


 


 


Monocytes # (Auto)


  0.47 K/uL


(0.11-0.59) 


 


 


Eosinophils # (Auto)


  0.22 K/uL


(0-0.5) 


 


 


Basophils # (Auto)


  0.04 K/uL


(0-0.2) 


 


 


RDW Standard Deviation


  48.1 fL


(36.4-46.3) 


 


 


RDW Coefficient of Variation


  14.6 %


(11.5-14.5) 


 


 


Immature Granulocyte % (Auto) 0.1 %  


 


Immature Granulocyte # (Auto)


  0.01 K/uL


(0.00-0.02) 


 


 


Prothrombin Time


  24.3 SECONDS


(9.0-12.0) 


 


 


Prothromb Time International


Ratio 2.4 (0.9-1.1) 


  


 


 


Activated Partial


Thromboplast Time 38.5 SECONDS


(21.0-31.0) 


 


 


Partial Thromboplastin Ratio 1.5  


 


Anion Gap


  4.0 mmol/L


(3-11) 


 


 


Estimated GFR (


American) 39.0 


  


 


 


Estimated GFR (Non-


American 33.7 


  


 


 


BUN/Creatinine Ratio 7.2 (10-20)  


 


Calcium Level


  8.7 mg/dl


(8.5-10.1) 


 


 


Magnesium Level


  2.1 mg/dl


(1.8-2.4) 


 


 


Total Bilirubin


  0.5 mg/dl


(0.2-1) 


 


 


Aspartate Amino Transf


(AST/SGOT) 17 U/L (15-37) 


  


 


 


Alanine Aminotransferase


(ALT/SGPT) 19 U/L (12-78) 


  


 


 


Alkaline Phosphatase


  64 U/L


() 


 


 


Total Creatine Kinase


  35 U/L


() 


 


 


Troponin I


  < 0.015 ng/ml


(0-0.045) 


 


 


Total Protein


  6.9 gm/dl


(6.4-8.2) 


 


 


Albumin


  3.0 gm/dl


(3.4-5.0) 


 


 


Globulin


  3.9 gm/dl


(2.5-4.0) 


 


 


Albumin/Globulin Ratio 0.8 (0.9-2)  


 


Thyroid Stimulating Hormone


(TSH) 1.570 uIu/ml


(0.300-4.500) 


 


 


Urine Color  YELLOW 


 


Urine Appearance  CLEAR (CLEAR) 


 


Urine pH  5.0 (4.5-7.5) 


 


Urine Specific Gravity


  


  1.012


(1.000-1.030)


 


Urine Protein  1+ (NEG) 


 


Urine Glucose (UA)  NEG (NEG) 


 


Urine Ketones  NEG (NEG) 


 


Urine Occult Blood  2+ (NEG) 


 


Urine Nitrite  NEG (NEG) 


 


Urine Bilirubin  NEG (NEG) 


 


Urine Urobilinogen  NEG (NEG) 


 


Urine Leukocyte Esterase  NEG (NEG) 


 


Urine WBC (Auto)  1-5 /hpf (0-5) 


 


Urine RBC (Auto)


  


  5-10 /hpf


(0-4)


 


Urine Hyaline Casts (Auto)  1-5 /lpf (0-5) 


 


Urine Epithelial Cells (Auto)  0-5 /lpf (0-5) 


 


Urine Bacteria (Auto)  NEG (NEG) 





Laboratory results reviewed by me.





Medications Administered











 Medications


  (Trade)  Dose


 Ordered  Sig/Marielle


 Route  Start Time


 Stop Time Status Last Admin


Dose Admin


 


 Sodium Chloride  250 ml @ 


 999 mls/hr  Q16M STAT


 IV  5/21/18 16:33


 5/21/18 16:48 DC 5/21/18 17:16


999 MLS/HR


 


 Sodium Chloride  500 ml @ 


 999 mls/hr  Q31M STAT


 IV  5/21/18 17:35


 5/21/18 18:05 DC 5/21/18 17:57


999 MLS/HR











ECG Per My Interpretation


Indication:  weakness


Rate (beats per minute):  77


Rhythm:  atrial fibrillation


Findings:  no ectopy, other (No ST elevation, no PVC)





ED Course


1629: The patient was evaluated in room B9. A complete history and physical 

exam was performed.





1633:  ml @ 999 mls/hr IV. 





1735: Orthostatic vital signs are positive. Blood pressure dropped 

significantly with standing. 





1735:  ml @ 999 mls/hr IV. 





1829: I reevaluated the patient. He is doing well. He is going to give a urine 

sample now. I asked him if he has been taking his Lasix and he says he has not 

been taking it. 





1930: I reevaluated the patient. He is feeling well and is ready to go home. I 

discussed his results and discharge instructions and he verbalized complete 

understanding and agreement.





Medical Decision


The differential diagnoses considered include dehydration, electrolyte imbalance

, anemia, stroke, infection, orthostasis, MI and medication reaction. 





There is no leukocytosis.  The patient is mildly anemic but this is baseline 

looking back at previous testing.  Creatinine is mildly elevated, likely 

consistent with some dehydration.  No hepatitis.  INR is therapeutic for 

someone using Coumadin.  The patient appears to be in a euthyroid state.  Chest 

x-ray does not show pneumonia or CHF--the film is improved compared to previous 

films.  EKG shows A. fib which is rate controlled.  Cardiac enzyme testing 1 

is not consistent with acute cardiac injury.  Brain CT shows no acute bleed or 

mass-effect.  Urinalysis does not show infection.  Orthostatic vital signs were 

positive.





The patient presents with weakness and a lower blood pressure.  He did receive 

about 750 cc of IV saline, his blood pressure is improved and he feels improved.





I think the patient was dehydrated.  He is being discharged, he will increase 

his fluid intake daily.  He will follow with his doctor's office.  If worsening

, he can return.





Medication Reconcilliation


Current Medication List:  was personally reviewed by me





Blood Pressure Screening


Patient's blood pressure:  Elevated blood pressure


Blood pressure disposition:  Elevated BP felt to be situational





Impression





 Primary Impression:  


 Dizziness


 Additional Impression:  


 Dehydration





Scribe Attestation


The scribe's documentation has been prepared under my direction and personally 

reviewed by me in its entirety. I confirm that the note above accurately 

reflects all work, treatment, procedures, and medical decision making performed 

by me.





Departure Information


Dispostion


Home / Self-Care





Referrals


Roshan Chavez M.D. (PCP)





Patient Instructions


My [facility] [place]





Additional Instructions





increase your water drinking by 1 glass as discussed


see the emmanuel md in a few days for a recheck


return for fever or worsening symptoms





Problem Qualifiers

## 2018-05-21 NOTE — DIAGNOSTIC IMAGING REPORT
CT OF THE HEAD WITHOUT CONTRAST



CLINICAL HISTORY: EVALUATE ALTERED MENTAL STATUS/WEAKNESS    



COMPARISON STUDY:  Head CT April 10, 2012. 



CT DOSE: 1572.52 mGy.cm



TECHNIQUE: Helical axial images of the head were obtained without IV contrast.

Automated exposure control was utilized for the study.  A dose lowering

technique was utilized adhering to the principles of ALARA.





FINDINGS: No acute intracranial hemorrhage, midline shift or mass effect is

present. White matter hypodensities suggest moderate small vessel disease. A

small focus of encephalomalacia within the superior right frontal lobe is

unchanged since head CT of April 10, 2012. Ventricular system is unremarkable

for age. Basilar cisterns are patent. There are no findings to suggest acute

dural sinus thrombosis or acute territorial infarct. Moderate mucosal opinion

the right maxillary sinus is noted. A few locules of gas within the right

infratemporal fossa are likely venous in location.



IMPRESSION:  



1. No acute intracranial findings.



2. Moderate thickening of the right maxillary sinus. 







Electronically signed by:  Eric Melendez M.D.

5/21/2018 5:42 PM



Dictated Date/Time:  5/21/2018 5:39 PM

## 2018-05-21 NOTE — DIAGNOSTIC IMAGING REPORT
CHEST ONE VIEW PORTABLE



CLINICAL HISTORY: EVALUATE ALTERED MENTAL STATUS/WEAKNESS    



COMPARISON STUDY:  Chest CT May 14, 2018.



FINDINGS: Left hemithorax volume loss is unchanged. Cardiomediastinal silhouette

is stable. There is no pneumothorax. Bilateral airspace opacities have improved

since exam of May 14, 2018. 



IMPRESSION:  



1. Interval resolution of right lung airspace opacities with improvement in left

lung airspace opacities.



2. No change in left hemithorax volume loss.









Electronically signed by:  Eric Melendez M.D.

5/21/2018 4:55 PM



Dictated Date/Time:  5/21/2018 4:53 PM

## 2018-08-22 ENCOUNTER — HOSPITAL ENCOUNTER (EMERGENCY)
Dept: HOSPITAL 45 - C.EDB | Age: 78
Discharge: HOME | End: 2018-08-22
Payer: COMMERCIAL

## 2018-08-22 VITALS
BODY MASS INDEX: 34.19 KG/M2 | HEIGHT: 72.01 IN | HEIGHT: 72.01 IN | BODY MASS INDEX: 34.19 KG/M2 | WEIGHT: 252.43 LBS | WEIGHT: 252.43 LBS

## 2018-08-22 VITALS — SYSTOLIC BLOOD PRESSURE: 166 MMHG | OXYGEN SATURATION: 100 % | DIASTOLIC BLOOD PRESSURE: 107 MMHG | HEART RATE: 83 BPM

## 2018-08-22 VITALS — TEMPERATURE: 97.52 F

## 2018-08-22 VITALS — OXYGEN SATURATION: 94 %

## 2018-08-22 DIAGNOSIS — Z79.899: ICD-10-CM

## 2018-08-22 DIAGNOSIS — M25.572: ICD-10-CM
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
## 2018-08-22 DIAGNOSIS — Z87.01: ICD-10-CM

## 2018-08-22 DIAGNOSIS — Z23: ICD-10-CM

## 2018-08-22 DIAGNOSIS — Z99.81: ICD-10-CM

## 2018-08-22 DIAGNOSIS — J44.9: ICD-10-CM

## 2018-08-22 DIAGNOSIS — S05.01XA: Primary | ICD-10-CM

## 2018-08-22 DIAGNOSIS — Z84.1: ICD-10-CM

## 2018-08-22 DIAGNOSIS — Z79.01: ICD-10-CM

## 2018-08-22 DIAGNOSIS — I48.91: ICD-10-CM

## 2018-08-22 DIAGNOSIS — Z88.1: ICD-10-CM

## 2018-08-22 DIAGNOSIS — N18.3: ICD-10-CM

## 2018-08-22 DIAGNOSIS — Z82.49: ICD-10-CM

## 2018-08-22 DIAGNOSIS — S00.83XA: ICD-10-CM

## 2018-08-22 DIAGNOSIS — I12.9: ICD-10-CM

## 2018-08-22 DIAGNOSIS — Z83.3: ICD-10-CM

## 2018-08-22 LAB
ALBUMIN SERPL-MCNC: 3 GM/DL (ref 3.4–5)
ALP SERPL-CCNC: 67 U/L (ref 45–117)
ALT SERPL-CCNC: 14 U/L (ref 12–78)
AST SERPL-CCNC: 14 U/L (ref 15–37)
BASOPHILS # BLD: 0.05 K/UL (ref 0–0.2)
BASOPHILS NFR BLD: 0.7 %
BUN SERPL-MCNC: 14 MG/DL (ref 7–18)
CA-I BLD-SCNC: 1.25 MMOL/L (ref 1.12–1.32)
CALCIUM SERPL-MCNC: 9.1 MG/DL (ref 8.5–10.1)
CO2 SERPL-SCNC: 31 MMOL/L (ref 21–32)
CREAT BLD-MCNC: 1.7 MG/DL (ref 0.6–1.3)
CREAT SERPL-MCNC: 1.73 MG/DL (ref 0.6–1.4)
EOS ABS #: 0.21 K/UL (ref 0–0.5)
EOSINOPHIL NFR BLD AUTO: 136 K/UL (ref 130–400)
GLUCOSE SERPL-MCNC: 124 MG/DL (ref 70–99)
HCT VFR BLD CALC: 36.4 % (ref 42–52)
HGB BLD-MCNC: 11.8 G/DL (ref 14–18)
IG#: 0.02 K/UL (ref 0–0.02)
IMM GRANULOCYTES NFR BLD AUTO: 19.5 %
INR PPP: 2.5 (ref 0.9–1.1)
ISTAT POTASSIUM: 4.3 MEQ/L (ref 3.3–5)
LIPASE: 147 U/L (ref 73–393)
LYMPHOCYTES # BLD: 1.48 K/UL (ref 1.2–3.4)
MCH RBC QN AUTO: 29.5 PG (ref 25–34)
MCHC RBC AUTO-ENTMCNC: 32.4 G/DL (ref 32–36)
MCV RBC AUTO: 91 FL (ref 80–100)
MONO ABS #: 0.35 K/UL (ref 0.11–0.59)
MONOCYTES NFR BLD: 4.6 %
NEUT ABS #: 5.49 K/UL (ref 1.4–6.5)
NEUTROPHILS # BLD AUTO: 2.8 %
NEUTROPHILS NFR BLD AUTO: 72.1 %
PMV BLD AUTO: 10.2 FL (ref 7.4–10.4)
POTASSIUM SERPL-SCNC: 4.2 MMOL/L (ref 3.5–5.1)
PROT SERPL-MCNC: 6.5 GM/DL (ref 6.4–8.2)
RED CELL DISTRIBUTION WIDTH CV: 15.6 % (ref 11.5–14.5)
RED CELL DISTRIBUTION WIDTH SD: 51.5 FL (ref 36.4–46.3)
SODIUM BLD-SCNC: 143 MEQ/L (ref 135–144)
SODIUM SERPL-SCNC: 144 MMOL/L (ref 136–145)
WBC # BLD AUTO: 7.6 K/UL (ref 4.8–10.8)
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
## 2018-08-22 NOTE — EMERGENCY ROOM VISIT NOTE
ED Visit Note


Slit Lamp Examination was performed of the left and right eye(s). The affected 

eye(s) were stained with Fluorescein stain to precipitate adequate 

visualization of any conjunctival/scleral excoriations or ulcers. The patient's 

face was comfortably rested on the chin guard of the slit lamp apparatus. The 

lights were dimmed and the affected eye(s) were thoroughly examined under 

microscopy using the blue light.  In the right eye no uptake was present 

however in the left eye there is superficial punctate regions in the patient's 

superior lateral corneal region just superior and lateral to the central axis 

of vision. Additionally, the eye(s) were examined under microscopy using the 

regular light. Close examination revealed what appeared to be superficial 

punctate abrasions.  No retained foreign body or glass in either eye.  Patient 

tolerated the procedure well and no complications were met.
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
## 2018-08-22 NOTE — DIAGNOSTIC IMAGING REPORT
L ANKLE MIN 3 VIEWS ROUTINE



CLINICAL HISTORY: Left ankle pain status post trauma     



COMPARISON: None.



DISCUSSION: Vascular calcifications are visualized. There are mild to moderate

degenerative changes present the ankle. No acute fractures or dislocations are

visualized.    



IMPRESSION: Degenerative change. No acute fractures or dislocations identified.







Electronically signed by:  Shaggy Segovia M.D.

8/22/2018 8:05 AM



Dictated Date/Time:  8/22/2018 8:05 AM

## 2018-08-22 NOTE — DIAGNOSTIC IMAGING REPORT
CT ABD/PELVIS IV AND ORAL CONT



CLINICAL HISTORY: Abdominal pain status post trauma    



COMPARISON STUDY:  None.



TECHNIQUE: Following the IV administration of 116 mL of Optiray-320, CT scan of

the abdomen and pelvis was performed from the lung bases to the proximal femurs.

Images are reviewed in the axial, sagittal, and coronal planes. IV contrast was

administered without complication.  A dose lowering technique was utilized

adhering to the principles of ALARA.





CT DOSE:    



FINDINGS:



Lower chest: There is volume loss in left hemithorax with left lower lobe

bronchiectatic changes.



Liver: The contrast-enhanced liver is normal in size, contour, and attenuation.

There is no intrahepatic biliary ductal dilatation. The hepatic veins and portal

veins are patent.



Gallbladder: Cholelithiasis



Spleen: Normal in size and attenuation.



Pancreas: There is an 11 mm cystic lesion within the pancreatic tail, likely

representing an IPMNs



Adrenal glands: There is a 2 cm left adrenal nodule. This remains unchanged from

an MRI study performed in October 2006.



Kidneys: There are bilateral renal cysts the largest of which measures 5.9 cm

and the right and 5.7 cm and the left. There is an enlarging indeterminate 24 mm

lesion arising from the lower pole the left kidney. This exceeds water

attenuation and is therefore indeterminate. An MRI is recommended in follow-up

to differentiate a hyperdense cyst from a solid renal neoplasm. There is a small

amount of fluid within the left perinephric space. There is irregularity of the

lower pole left renal cyst. This may indicate cyst rupture. If deemed clinically

indicated, delayed imaging could be obtained to exclude a urinoma.



Bowel: There are no transition zones indicate bowel obstruction. There is no

acute diverticulitis. There are scattered colonic diverticula. The appendix

appears normal.



Peritoneum: There is no free air. There is a small amount of fluid within the

left perirenal space.



Vasculature: The abdominal aorta is normal in course and caliber.



Adenopathy: None.



Pelvic viscera: The bladder, and pelvic viscera are unremarkable.



Skeletal structures: No destructive osseous lesions are seen. No fractures are

visualized.



IMPRESSION:  

1. Left-sided perinephric fluid, possibly secondary to a ruptured renal cyst.

Delayed imaging could be obtained in follow-up to exclude a urinoma

2. Bilateral renal cysts

3. Indeterminate 24 mm lesion arising from the lower pole the left kidney. A

nonemergent MRI is recommended in follow-up to differentiate a solid renal

neoplasm from a hyperdense cyst

4. Cholelithiasis

5. Stable 2 cm left adrenal nodule

6. 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

7. No evidence of hepatic or splenic injury







Electronically signed by:  Shaggy Segovia M.D.

8/22/2018 8:51 AM



Dictated Date/Time:  8/22/2018 8:38 AM

## 2018-08-22 NOTE — DIAGNOSTIC IMAGING REPORT
CT OF THE CERVICAL SPINE



CLINICAL HISTORY: Neck pain status post trauma    



COMPARISON STUDY:  No previous studies for comparison. 



CT DOSE:    



TECHNIQUE: CT scan of the cervical spine was performed from the skull base to

the thoracic inlet. Images are reviewed in the axial, sagittal, and coronal

planes. IV contrast was not administered for this examination.  A dose lowering

technique was utilized adhering to the principles of ALARA.





FINDINGS:



The visualized portions of the lung apices reveal no evidence of pneumothorax.



The prevertebral soft tissues are normal.  No fractures or subluxations are

visualized.



There are multilevel degenerative changes





IMPRESSION: No evidence of acute fracture or traumatic subluxation.







Electronically signed by:  Shaggy Segovia M.D.

8/22/2018 8:38 AM



Dictated Date/Time:  8/22/2018 8:34 AM

## 2018-08-22 NOTE — DIAGNOSTIC IMAGING REPORT
CT HEAD WITHOUT CONTRAST (CT)



CLINICAL HISTORY: Head pain status post trauma    



COMPARISON STUDY:  May 21, 2018



TECHNIQUE:  Axial CT of the brain is performed from the vertex to the skull

base. IV contrast was not administered for this examination. A dose lowering

technique was utilized adhering to the principles of ALARA.

 



CT DOSE: 3881.16 mGy.cm



FINDINGS:



No intra or extra-axial mass lesions are visualized. There is no CT evidence of

acute cortical infarction. There is no evidence of midline shift. There is no

acute  hemorrhage. No calvarial fractures are visualized. 

There are moderate white matter hypodensities likely on a small vessel basis.

There is a stable focus of encephalomalacia within the right superior frontal

lobe.

There is no evidence of pathologic ventricular dilatation.

There is moderate right maxillary sinus mucosal thickening.

There is a large frontal scalp hematoma.





IMPRESSION: 

1. Large right frontal scalp hematoma

2. No evidence of acute intracranial injury







Electronically signed by:  Shaggy Segovia M.D.

8/22/2018 8:38 AM



Dictated Date/Time:  8/22/2018 8:31 AM
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
## 2023-10-06 NOTE — PULMONARY PROGRESS NOTE
DATE: 11/29/2017

 

TIME:  09:40 a.m.

 

SUBJECTIVE:  The patient indicates he is feeling better.  He feels he is not

wheezing as much as before.  He has expectorated some phlegm.  He states they

were able to get a specimen.  He has been actively working on his incentive

spirometry.  He states he can get up between 1000 and 1200 mL.  He is

complaining of some constipation.  He states he takes stool softeners at

home.  He does complain of chronic insomnia.  Apparently, he takes trazodone

and melatonin at night.  The melatonin is not ordered here at present

according to the patient.  He did tell me that he usually sleeps at home from

about 10:00 p.m. until 10:00 a.m.  He insists he does this because his wife

sleeps this long period of time and he tries to match his sleep with hers. 

He denies daytime sleepiness.

 

OBJECTIVE:

GENERAL:  The patient is comfortable at rest.

VITAL SIGNS:  Temperature is 36.4.  Heart rate was 75 per minute.  The rhythm

was irregular.  He does have a history of atrial fibrillation.  Blood

pressure was elevated early this morning at 197/74.  Repeat at 09:19 a.m. was

159/74.

ENT:  Unchanged from prior.

CHEST:  Auscultation of the lung fields again reveals diminished breath

sounds on the left compared with the right.  Also, he has rales in the left

chest.  Oxygen saturation is 94% on 2 liters.

EXTREMITIES:  Showed varicose veins, but no edema.

 

IMPRESSIONS:

1.  Left lung pneumonia.

2.  Significant atelectasis and volume loss of the left lung.

3.  Mediastinal adenopathy.

4.  Multiple lung nodules.

5.  Asthma/chronic obstructive pulmonary disease by history.

6.  Small amount of possible hemoptysis.

7.  Asbestos exposure by history.

8.  Elevated BNP.

9.  Dysphagia.

 

COMMENTS AND RECOMMENDATIONS:  The patient states he feels clinically better.

 I would change his steroids to oral.  I believe the patient should have a

barium swallow to evaluate his esophagus.  He is requesting a stool softener.

 I will order melatonin for him.  If he continues to improve, he may be able

to be discharged tomorrow.  He is anxious to get home to his wife, who has

some health issues.  Perhaps a physical therapy evaluation would be

appropriate to get him ambulating in the pepper and so forth.
Yes